# Patient Record
Sex: MALE | Race: WHITE | Employment: PART TIME | URBAN - METROPOLITAN AREA
[De-identification: names, ages, dates, MRNs, and addresses within clinical notes are randomized per-mention and may not be internally consistent; named-entity substitution may affect disease eponyms.]

---

## 2022-11-05 ENCOUNTER — APPOINTMENT (OUTPATIENT)
Dept: GENERAL RADIOLOGY | Age: 22
End: 2022-11-05
Payer: COMMERCIAL

## 2022-11-05 ENCOUNTER — HOSPITAL ENCOUNTER (EMERGENCY)
Age: 22
Discharge: HOME | End: 2022-11-05
Payer: COMMERCIAL

## 2022-11-05 VITALS
TEMPERATURE: 98.7 F | SYSTOLIC BLOOD PRESSURE: 123 MMHG | WEIGHT: 160 LBS | OXYGEN SATURATION: 98 % | DIASTOLIC BLOOD PRESSURE: 71 MMHG | RESPIRATION RATE: 19 BRPM | HEART RATE: 78 BPM

## 2022-11-05 DIAGNOSIS — M25.512 ACUTE PAIN OF LEFT SHOULDER: Primary | ICD-10-CM

## 2022-11-05 PROCEDURE — 73030 X-RAY EXAM OF SHOULDER: CPT

## 2022-11-05 PROCEDURE — 99283 EMERGENCY DEPT VISIT LOW MDM: CPT

## 2022-11-05 PROCEDURE — 6370000000 HC RX 637 (ALT 250 FOR IP): Performed by: PHYSICIAN ASSISTANT

## 2022-11-05 RX ORDER — IBUPROFEN 800 MG/1
800 TABLET ORAL ONCE
Status: COMPLETED | OUTPATIENT
Start: 2022-11-05 | End: 2022-11-05

## 2022-11-05 RX ORDER — IBUPROFEN 800 MG/1
800 TABLET ORAL EVERY 8 HOURS PRN
Qty: 30 TABLET | Refills: 0 | Status: SHIPPED | OUTPATIENT
Start: 2022-11-05 | End: 2022-11-15

## 2022-11-05 RX ADMIN — IBUPROFEN 800 MG: 800 TABLET, FILM COATED ORAL at 19:59

## 2022-11-05 ASSESSMENT — PAIN DESCRIPTION - LOCATION: LOCATION: SHOULDER

## 2022-11-05 ASSESSMENT — ENCOUNTER SYMPTOMS
CHEST TIGHTNESS: 0
COUGH: 0
COLOR CHANGE: 0
SHORTNESS OF BREATH: 0

## 2022-11-05 ASSESSMENT — PAIN SCALES - GENERAL
PAINLEVEL_OUTOF10: 6
PAINLEVEL_OUTOF10: 7

## 2022-11-05 ASSESSMENT — PAIN DESCRIPTION - ORIENTATION: ORIENTATION: LEFT

## 2022-11-05 ASSESSMENT — LIFESTYLE VARIABLES
HOW OFTEN DO YOU HAVE A DRINK CONTAINING ALCOHOL: NEVER
HOW MANY STANDARD DRINKS CONTAINING ALCOHOL DO YOU HAVE ON A TYPICAL DAY: PATIENT DOES NOT DRINK

## 2022-11-05 ASSESSMENT — PAIN - FUNCTIONAL ASSESSMENT: PAIN_FUNCTIONAL_ASSESSMENT: 0-10

## 2022-11-05 ASSESSMENT — PAIN DESCRIPTION - DESCRIPTORS: DESCRIPTORS: ACHING

## 2022-11-06 NOTE — ED PROVIDER NOTES
[facility]        Department of Emergency Medicine   ED  Provider Note  Admit Date/RoomTime: 11/5/2022  7:54 PM  ED Room: [place] [address]  HPI:  11/5/22, Time: 8:44 PM EDT      The patient is a 79-year-old male presenting the emergency department with left shoulder pain. Patient states he woke up yesterday morning and it was kind of sore. He states he did not think much of it and thought that maybe he slept on it wrong. He states today it continued to be sore but was actually feeling better. He states about 45 minutes prior to arrival to the ED he had a sudden onset sharp pain in the back of his shoulder when he went to get out of bed. He states it hurts when he moves his arm any certain way or when he takes in a deep breath. Patient has not tried taking any medication. He states sometimes it radiates to his upper arm. He did not have any trauma or injury. He denies any swelling, numbness or tingling, neck or back pain, chest pain or SOB. The history is provided by the patient. No  was used. REVIEW OF SYSTEMS:  Review of Systems   Constitutional:  Negative for activity change, chills, fatigue and fever. Respiratory:  Negative for cough, chest tightness and shortness of breath. Cardiovascular:  Negative for chest pain, palpitations and leg swelling. Musculoskeletal:  Positive for arthralgias. Negative for joint swelling, myalgias, neck pain and neck stiffness. Skin:  Negative for color change, pallor, rash and wound. Neurological:  Negative for dizziness, weakness, light-headedness and headaches. Psychiatric/Behavioral:  Negative for agitation, behavioral problems and confusion. Pertinent positives and negatives are stated within HPI, all other systems reviewed and are negative.      --------------------------------------------- PAST HISTORY ---------------------------------------------  Past Medical History:  has no past medical history on file.     Past Surgical History:  has no past surgical history on file. Social History:  reports that he has never smoked. He has never used smokeless tobacco. He reports that he does not currently use alcohol. He reports that he does not use drugs. Family History: family history is not on file. The patients home medications have been reviewed. Allergies: Patient has no known allergies. -------------------------------------------------- RESULTS -------------------------------------------------  All laboratory and radiology results have been personally reviewed by myself   LABS:  No results found for this visit on 11/05/22. RADIOLOGY:  Interpreted by Radiologist.  XR SHOULDER LEFT (MIN 2 VIEWS)   Final Result   Normal left shoulder series. ------------------------- NURSING NOTES AND VITALS REVIEWED ---------------------------   The nursing notes within the ED encounter and vital signs as below have been reviewed. /71   Pulse 78   Temp 98.7 °F (37.1 °C)   Resp 19   Wt 160 lb (72.6 kg)   SpO2 98%   Oxygen Saturation Interpretation: Normal      ---------------------------------------------------PHYSICAL EXAM--------------------------------------    Physical Exam  Vitals and nursing note reviewed. Constitutional:       General: He is not in acute distress. Appearance: Normal appearance. He is well-developed. HENT:      Head: Normocephalic and atraumatic. Mouth/Throat:      Mouth: Mucous membranes are moist.   Cardiovascular:      Rate and Rhythm: Normal rate and regular rhythm. Heart sounds: Normal heart sounds. No murmur heard. Pulmonary:      Effort: Pulmonary effort is normal. No respiratory distress. Breath sounds: Normal breath sounds. Musculoskeletal:         General: Tenderness present. No swelling or deformity. Normal range of motion. Cervical back: Normal range of motion and neck supple. No rigidity. Comments: TTP over the left scapula.  Pain when stretching the left arm across the body. Pain with supination and pronation. FROM of the LUE. 2+ radial pulse. +5/5 BUE strength. Skin:     General: Skin is warm and dry. Capillary Refill: Capillary refill takes less than 2 seconds. Findings: No erythema. Neurological:      General: No focal deficit present. Mental Status: He is alert and oriented to person, place, and time. Mental status is at baseline. Coordination: Coordination normal.   Psychiatric:         Mood and Affect: Mood normal.         Behavior: Behavior normal.         Thought Content: Thought content normal.          ------------------------------ ED COURSE/MEDICAL DECISION MAKING----------------------  Medications   ibuprofen (ADVIL;MOTRIN) tablet 800 mg (800 mg Oral Given 11/5/22 1959)         ED COURSE:     XR SHOULDER LEFT (MIN 2 VIEWS)   Final Result   Normal left shoulder series. Procedures:  Procedures     Medical Decision Making:   MDM     68-year-old male presenting the ED with nontraumatic left shoulder pain. He states it was aching yesterday but then today he had a sudden onset sharp pain in the back of his shoulder that has not went away. He has pain when he extends his arm across his body and tenderness over the rhomboid/scapular area. He has no signs of any neurovascular compromise or an infectious process. No CP or SOB. He is afebrile and hemodynamically stable. Pt was given motrin. He reported the pain actually moved and it hurts to turn his head to the left. XR was unremarkable. Pt's pain seems to be musculoskeletal in nature and may have been from the way he slept given he did not have an injury. He will be treated with NSAIDs and educated on RICE. He is encouraged to return with any new or worsening symptoms or follow-up with his PCP. Counseling:    The emergency provider has spoken with the patient and discussed todays results, in addition to providing specific details for the plan of care and counseling regarding the diagnosis and prognosis. Questions are answered at this time and they are agreeable with the plan.      --------------------------------- IMPRESSION AND DISPOSITION ---------------------------------    IMPRESSION  1. Acute pain of left shoulder        DISPOSITION  Disposition: Discharge to home  Patient condition is good      Electronically signed by Graeme Sevilla PA-C   DD: 11/5/22  **This report was transcribed using voice recognition software. Every effort was made to ensure accuracy; however, inadvertent computerized transcription errors may be present.   END OF ED PROVIDER NOTE         Graeme Sveilla PA-C  11/05/22 5340

## 2022-12-12 ENCOUNTER — HOSPITAL ENCOUNTER (INPATIENT)
Age: 22
LOS: 4 days | Discharge: HOME | End: 2022-12-16
Attending: EMERGENCY MEDICINE | Admitting: INTERNAL MEDICINE
Payer: COMMERCIAL

## 2022-12-12 ENCOUNTER — APPOINTMENT (OUTPATIENT)
Dept: GENERAL RADIOLOGY | Age: 22
End: 2022-12-12
Payer: COMMERCIAL

## 2022-12-12 DIAGNOSIS — R07.9 CHEST PAIN, UNSPECIFIED TYPE: Primary | ICD-10-CM

## 2022-12-12 DIAGNOSIS — I47.20 V-TACH: ICD-10-CM

## 2022-12-12 DIAGNOSIS — R00.2 PALPITATIONS: ICD-10-CM

## 2022-12-12 PROBLEM — R00.0 WIDE-COMPLEX TACHYCARDIA: Status: ACTIVE | Noted: 2022-12-12

## 2022-12-12 LAB
ACETAMINOPHEN LEVEL: <5 MCG/ML (ref 10–30)
AMPHETAMINE SCREEN, URINE: NOT DETECTED
ANION GAP SERPL CALCULATED.3IONS-SCNC: 13 MMOL/L (ref 7–16)
BARBITURATE SCREEN URINE: NOT DETECTED
BASOPHILS ABSOLUTE: 0.02 E9/L (ref 0–0.2)
BASOPHILS RELATIVE PERCENT: 0.2 % (ref 0–2)
BENZODIAZEPINE SCREEN, URINE: NOT DETECTED
BUN BLDV-MCNC: 11 MG/DL (ref 6–20)
CALCIUM IONIZED: 1.27 MMOL/L (ref 1.15–1.33)
CALCIUM SERPL-MCNC: 10.1 MG/DL (ref 8.6–10.2)
CANNABINOID SCREEN URINE: NOT DETECTED
CHLORIDE BLD-SCNC: 100 MMOL/L (ref 98–107)
CO2: 27 MMOL/L (ref 22–29)
COCAINE METABOLITE SCREEN URINE: NOT DETECTED
CREAT SERPL-MCNC: 1.1 MG/DL (ref 0.7–1.2)
EKG ATRIAL RATE: 222 BPM
EKG ATRIAL RATE: 92 BPM
EKG P AXIS: 75 DEGREES
EKG P-R INTERVAL: 142 MS
EKG Q-T INTERVAL: 206 MS
EKG Q-T INTERVAL: 336 MS
EKG QRS DURATION: 162 MS
EKG QRS DURATION: 92 MS
EKG QTC CALCULATION (BAZETT): 396 MS
EKG QTC CALCULATION (BAZETT): 415 MS
EKG R AXIS: -146 DEGREES
EKG R AXIS: 80 DEGREES
EKG T AXIS: 56 DEGREES
EKG T AXIS: 94 DEGREES
EKG VENTRICULAR RATE: 222 BPM
EKG VENTRICULAR RATE: 92 BPM
EOSINOPHILS ABSOLUTE: 0.13 E9/L (ref 0.05–0.5)
EOSINOPHILS RELATIVE PERCENT: 1.5 % (ref 0–6)
ETHANOL: <10 MG/DL (ref 0–0.08)
FENTANYL SCREEN, URINE: NOT DETECTED
GFR SERPL CREATININE-BSD FRML MDRD: >60 ML/MIN/1.73
GLUCOSE BLD-MCNC: 92 MG/DL (ref 74–99)
HCT VFR BLD CALC: 46.7 % (ref 37–54)
HEMOGLOBIN: 15 G/DL (ref 12.5–16.5)
IMMATURE GRANULOCYTES #: 0.02 E9/L
IMMATURE GRANULOCYTES %: 0.2 % (ref 0–5)
LYMPHOCYTES ABSOLUTE: 3.96 E9/L (ref 1.5–4)
LYMPHOCYTES RELATIVE PERCENT: 45.1 % (ref 20–42)
Lab: NORMAL
MAGNESIUM: 2.3 MG/DL (ref 1.6–2.6)
MCH RBC QN AUTO: 28 PG (ref 26–35)
MCHC RBC AUTO-ENTMCNC: 32.1 % (ref 32–34.5)
MCV RBC AUTO: 87.3 FL (ref 80–99.9)
METHADONE SCREEN, URINE: NOT DETECTED
MONOCYTES ABSOLUTE: 0.36 E9/L (ref 0.1–0.95)
MONOCYTES RELATIVE PERCENT: 4.1 % (ref 2–12)
NEUTROPHILS ABSOLUTE: 4.3 E9/L (ref 1.8–7.3)
NEUTROPHILS RELATIVE PERCENT: 48.9 % (ref 43–80)
OPIATE SCREEN URINE: NOT DETECTED
OXYCODONE URINE: NOT DETECTED
PDW BLD-RTO: 11.8 FL (ref 11.5–15)
PHENCYCLIDINE SCREEN URINE: NOT DETECTED
PLATELET # BLD: 258 E9/L (ref 130–450)
PMV BLD AUTO: 10.2 FL (ref 7–12)
POTASSIUM SERPL-SCNC: 3.7 MMOL/L (ref 3.5–5)
RBC # BLD: 5.35 E12/L (ref 3.8–5.8)
SALICYLATE, SERUM: <0.3 MG/DL (ref 0–30)
SODIUM BLD-SCNC: 140 MMOL/L (ref 132–146)
T4 TOTAL: 6.9 MCG/DL (ref 4.5–11.7)
TRICYCLIC ANTIDEPRESSANTS SCREEN SERUM: NEGATIVE NG/ML
TROPONIN, HIGH SENSITIVITY: 32 NG/L (ref 0–11)
TROPONIN, HIGH SENSITIVITY: 53 NG/L (ref 0–11)
TROPONIN, HIGH SENSITIVITY: 64 NG/L (ref 0–11)
TSH SERPL DL<=0.05 MIU/L-ACNC: 4.19 UIU/ML (ref 0.27–4.2)
WBC # BLD: 8.8 E9/L (ref 4.5–11.5)

## 2022-12-12 PROCEDURE — 99223 1ST HOSP IP/OBS HIGH 75: CPT | Performed by: INTERNAL MEDICINE

## 2022-12-12 PROCEDURE — 80179 DRUG ASSAY SALICYLATE: CPT

## 2022-12-12 PROCEDURE — APPSS45 APP SPLIT SHARED TIME 31-45 MINUTES: Performed by: PHYSICIAN ASSISTANT

## 2022-12-12 PROCEDURE — 93005 ELECTROCARDIOGRAM TRACING: CPT | Performed by: INTERNAL MEDICINE

## 2022-12-12 PROCEDURE — 93010 ELECTROCARDIOGRAM REPORT: CPT | Performed by: INTERNAL MEDICINE

## 2022-12-12 PROCEDURE — 80048 BASIC METABOLIC PNL TOTAL CA: CPT

## 2022-12-12 PROCEDURE — 99221 1ST HOSP IP/OBS SF/LOW 40: CPT | Performed by: INTERNAL MEDICINE

## 2022-12-12 PROCEDURE — 6360000002 HC RX W HCPCS

## 2022-12-12 PROCEDURE — 82077 ASSAY SPEC XCP UR&BREATH IA: CPT

## 2022-12-12 PROCEDURE — 84436 ASSAY OF TOTAL THYROXINE: CPT

## 2022-12-12 PROCEDURE — 93306 TTE W/DOPPLER COMPLETE: CPT

## 2022-12-12 PROCEDURE — 1200000000 HC SEMI PRIVATE

## 2022-12-12 PROCEDURE — 99285 EMERGENCY DEPT VISIT HI MDM: CPT

## 2022-12-12 PROCEDURE — 6360000002 HC RX W HCPCS: Performed by: INTERNAL MEDICINE

## 2022-12-12 PROCEDURE — 2580000003 HC RX 258: Performed by: INTERNAL MEDICINE

## 2022-12-12 PROCEDURE — 84443 ASSAY THYROID STIM HORMONE: CPT

## 2022-12-12 PROCEDURE — 80143 DRUG ASSAY ACETAMINOPHEN: CPT

## 2022-12-12 PROCEDURE — 36415 COLL VENOUS BLD VENIPUNCTURE: CPT

## 2022-12-12 PROCEDURE — 80307 DRUG TEST PRSMV CHEM ANLYZR: CPT

## 2022-12-12 PROCEDURE — 71045 X-RAY EXAM CHEST 1 VIEW: CPT

## 2022-12-12 PROCEDURE — 6370000000 HC RX 637 (ALT 250 FOR IP)

## 2022-12-12 PROCEDURE — 93005 ELECTROCARDIOGRAM TRACING: CPT | Performed by: EMERGENCY MEDICINE

## 2022-12-12 PROCEDURE — 84484 ASSAY OF TROPONIN QUANT: CPT

## 2022-12-12 PROCEDURE — 83735 ASSAY OF MAGNESIUM: CPT

## 2022-12-12 PROCEDURE — 82330 ASSAY OF CALCIUM: CPT

## 2022-12-12 PROCEDURE — 6370000000 HC RX 637 (ALT 250 FOR IP): Performed by: EMERGENCY MEDICINE

## 2022-12-12 PROCEDURE — 85025 COMPLETE CBC W/AUTO DIFF WBC: CPT

## 2022-12-12 PROCEDURE — 96374 THER/PROPH/DIAG INJ IV PUSH: CPT

## 2022-12-12 RX ORDER — ACETAMINOPHEN 325 MG/1
650 TABLET ORAL EVERY 6 HOURS PRN
Status: DISCONTINUED | OUTPATIENT
Start: 2022-12-12 | End: 2022-12-16 | Stop reason: HOSPADM

## 2022-12-12 RX ORDER — SODIUM CHLORIDE 0.9 % (FLUSH) 0.9 %
10 SYRINGE (ML) INJECTION EVERY 12 HOURS SCHEDULED
Status: DISCONTINUED | OUTPATIENT
Start: 2022-12-12 | End: 2022-12-16 | Stop reason: HOSPADM

## 2022-12-12 RX ORDER — POTASSIUM CHLORIDE 20 MEQ/1
40 TABLET, EXTENDED RELEASE ORAL ONCE
Status: COMPLETED | OUTPATIENT
Start: 2022-12-12 | End: 2022-12-12

## 2022-12-12 RX ORDER — ONDANSETRON 4 MG/1
4 TABLET, ORALLY DISINTEGRATING ORAL EVERY 8 HOURS PRN
Status: DISCONTINUED | OUTPATIENT
Start: 2022-12-12 | End: 2022-12-15

## 2022-12-12 RX ORDER — ENOXAPARIN SODIUM 100 MG/ML
40 INJECTION SUBCUTANEOUS DAILY
Status: DISCONTINUED | OUTPATIENT
Start: 2022-12-12 | End: 2022-12-13

## 2022-12-12 RX ORDER — ACETAMINOPHEN 650 MG/1
650 SUPPOSITORY RECTAL EVERY 6 HOURS PRN
Status: DISCONTINUED | OUTPATIENT
Start: 2022-12-12 | End: 2022-12-16 | Stop reason: HOSPADM

## 2022-12-12 RX ORDER — SODIUM CHLORIDE 9 MG/ML
INJECTION, SOLUTION INTRAVENOUS PRN
Status: DISCONTINUED | OUTPATIENT
Start: 2022-12-12 | End: 2022-12-16 | Stop reason: HOSPADM

## 2022-12-12 RX ORDER — SODIUM CHLORIDE 0.9 % (FLUSH) 0.9 %
10 SYRINGE (ML) INJECTION PRN
Status: DISCONTINUED | OUTPATIENT
Start: 2022-12-12 | End: 2022-12-16 | Stop reason: HOSPADM

## 2022-12-12 RX ORDER — LIDOCAINE HYDROCHLORIDE 20 MG/ML
INJECTION, SOLUTION INTRAVENOUS
Status: COMPLETED
Start: 2022-12-12 | End: 2022-12-12

## 2022-12-12 RX ORDER — ASPIRIN 81 MG/1
324 TABLET, CHEWABLE ORAL ONCE
Status: COMPLETED | OUTPATIENT
Start: 2022-12-12 | End: 2022-12-12

## 2022-12-12 RX ORDER — ONDANSETRON 2 MG/ML
4 INJECTION INTRAMUSCULAR; INTRAVENOUS EVERY 6 HOURS PRN
Status: DISCONTINUED | OUTPATIENT
Start: 2022-12-12 | End: 2022-12-15

## 2022-12-12 RX ADMIN — ASPIRIN 324 MG: 81 TABLET, CHEWABLE ORAL at 04:59

## 2022-12-12 RX ADMIN — POTASSIUM CHLORIDE 40 MEQ: 1500 TABLET, EXTENDED RELEASE ORAL at 03:25

## 2022-12-12 RX ADMIN — ENOXAPARIN SODIUM 40 MG: 100 INJECTION SUBCUTANEOUS at 09:49

## 2022-12-12 RX ADMIN — Medication 10 ML: at 09:49

## 2022-12-12 RX ADMIN — Medication 10 ML: at 21:16

## 2022-12-12 RX ADMIN — LIDOCAINE HYDROCHLORIDE: 20 INJECTION INTRAVENOUS at 02:17

## 2022-12-12 ASSESSMENT — ENCOUNTER SYMPTOMS
ABDOMINAL PAIN: 0
SORE THROAT: 0
EYES NEGATIVE: 1
DIARRHEA: 0
TROUBLE SWALLOWING: 0
EYE REDNESS: 0
SHORTNESS OF BREATH: 0
NAUSEA: 0
VOMITING: 0

## 2022-12-12 ASSESSMENT — PAIN - FUNCTIONAL ASSESSMENT: PAIN_FUNCTIONAL_ASSESSMENT: NONE - DENIES PAIN

## 2022-12-12 NOTE — PROGRESS NOTES
Hospitalist Progress Note      Chart reviewed. Patient admitted this morning by my colleague for Yusef Hodge. He presented to the ED with complaints of palpitations lasting approximately 30 minutes. EKG in ED revealed V. tach for which she was given lidocaine and successfully converted to normal sinus rhythm. Troponin was elevated-32, 53. He was admitted with consultation to cardiology. Non-billable rounding. Thanks for allowing us to participate in the care of Sara Edmondson. We will continue to follow along.  Please do not hesitate to contact us if needed.  +++++++++++++++++++++++++++++++++++++++++++++++++  Merced Fernando [address]

## 2022-12-12 NOTE — ED NOTES
Patients father called and is now on his way to the ED      [facility]  12/12/22 0124

## 2022-12-12 NOTE — CONSULTS
[facility] and [facility] Electrophysiology  Consultation Report  PATIENT: SAMANTHA Noel RECORD NUMBER: [id number]  DATE OF SERVICE:  12/12/2022  ATTENDING ELECTROPHYSIOLOGIST: Rafi Owens MD   PRIMARY ELECTROPHYSIOLOGIST: Rafi Owens MD   REFERRING PHYSICIAN: No ref. provider found and Lenora Joseph MD (Inactive)  CHIEF COMPLAINT: Palpitations     HPI: This is a 23 y.o. male with a history of appendicitis S/p appendectomy. He notes no familial history of sudden cardiac death, no syncope. He has complained of palpitations since he was 17years old typically it were associated with exertion, during basketball or work, lasting 30 to 60 minutes at 1 point he approached his pediatrician regarding this issue an EKG was performed and was described as normal and thus no additional testing was performed. This episodes are self terminating without a vagal maneuvers associated with lightheadedness, chest discomfort and feeling of rapid heartbeat. He does drink alcohol, vape. 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 Yesterday (12/11/2022) at 2200 he was at work when he began to have palpitations these palpitations lasted for approximately 3 hours as it did not stop spontaneously he presented to the emergency department [facility] where he was found in a wide-complex tachycardia with a heart rate of 220 bpm, in the emergency department he was given IV lidocaine and spontaneously converted to sinus rhythm at that time. Patient Active Problem List   Diagnosis    Ventricular tachyarrhythmia    Palpitation   who presents to the hospital complaining of palpitations, heart racing. Cardiac electrophysiology service is consulted for wide-complex tachycardia, suspected SVT.     Patient Active Problem List    Diagnosis Date Noted    Ventricular tachyarrhythmia 12/12/2022     Priority: Medium    Palpitation 12/12/2022 Priority: Medium       No past medical history on file. No family history on file. Social History     Tobacco Use    Smoking status: Never    Smokeless tobacco: Never   Substance Use Topics    Alcohol use: Not Currently       Current Facility-Administered Medications   Medication Dose Route Frequency Provider Last Rate Last Admin    sodium chloride flush 0.9 % injection 10 mL  10 mL IntraVENous 2 times per day Dominic Julien MD   10 mL at 12/12/22 0949    sodium chloride flush 0.9 % injection 10 mL  10 mL IntraVENous PRN James Rivas MD        0.9 % sodium chloride infusion   IntraVENous PRN Dominic Julien MD        enoxaparin (LOVENOX) injection 40 mg  40 mg SubCUTAneous Daily James Rivas MD   40 mg at 12/12/22 0949    ondansetron (ZOFRAN-ODT) disintegrating tablet 4 mg  4 mg Oral Q8H PRN James Rivas MD        Or    [facility] injection 4 mg  4 mg IntraVENous Q6H PRN James Rivas MD        magnesium hydroxide (MILK OF MAGNESIA) 400 MG/5ML suspension 30 mL  30 mL Oral Daily PRN Dominic Julien MD        acetaminophen (TYLENOL) tablet 650 mg  650 mg Oral Q6H PRN James Rivas MD        Or    acetaminophen (TYLENOL) suppository 650 mg  650 mg Rectal Q6H PRN James Rivas MD            No Known Allergies    ROS:   Constitutional: Negative for fever, activity change and appetite change. HENT: Negative for epistaxis. Eyes: Negative for diploplia, blurred vision. Respiratory: Negative for cough, chest tightness, shortness of breath and wheezing. Cardiovascular: pertinent positives in HPI  Gastrointestinal: Negative for abdominal pain and blood in stool.    All other review of systems are negative     PHYSICAL EXAM:   Vitals:    12/12/22 0723 12/12/22 0951 12/12/22 1103 12/12/22 1203   BP: (!) 100/52 97/64 (!) 93/53 (!) 101/58   Pulse: 76 93 69 88   Resp: 17 15 15 17   Temp:    97.5 °F (36.4 °C)   TempSrc:    Temporal   SpO2: 96% 98% 97% 98%   Height: Constitutional: Well-developed, no acute distress  Eyes: conjunctivae normal, no xanthelasma   Ears, Nose, Throat: oral mucosa moist, no cyanosis   CV: no JVD. Regular rate and rhythm. Normal S1S2 and no S3. No murmurs, rubs, or gallops. PMI is nondisplaced  Lungs: clear to auscultation bilaterally, normal respiratory effort without used of accessory muscles  Abdomen: soft, non-tender, bowel sounds present, no masses or hepatomegaly   Musculoskeletal: no digital clubbing, no edema   Skin: warm, no rashes     I have personally reviewed the laboratory, cardiac diagnostic and radiographic testing as outlined below:    Data:    Recent Labs     12/12/22 0222   WBC 8.8   HGB 15.0   HCT 46.7        Recent Labs     12/12/22 0222      K 3.7      CO2 27   BUN 11   CREATININE 1.1   CALCIUM 10.1      Lab Results   Component Value Date/Time    MG 2.3 12/12/2022 02:22 AM     Recent Labs     12/12/22 0222   TSH 4.190     No results for input(s): INR in the last 72 hours. CXR 12/12/2022: The lungs are clear without focal consolidation, large pleural effusion, or   pneumothorax. The cardiomediastinal silhouette is stable. Impression   No acute cardiopulmonary process. Telemetry: No rhythm strips of conversion of WCT to sinus were saved, ongoing sinus rhythm    EKG 12/12/2022: WCT with a rate of 222 bpm, QRS 162ms, suspected SVT with aberrancy  Please see scan in Cardiology. EKG 12/12/2022  NSR no delta waves rate 92 BPM, , QTc 415ms       Echocardiogram: Pending     WCT       I have independently reviewed all of the ECGs and rhythm strips per above     Assessment/Plan:      1. Wide complex tachycardia   - Suspect SVT with aberrancy. - Ongoing complaints since 17years of age, no prior syncope nor SCD in the Family. - Terminated with IV Lidocaine Adenosine not tried (12/11/2022).        Recommendations:  Await echo  Cardiac MRI   Possible EP study pending the results of the above testing. At this time no indication for ischemic testing. EP will follow. I have spent a total of 10 minutes with the patient and the family reviewing the above stated recommendations. And a total of >50% of that time involved face-to-face time providing counseling and or coordination of care with the other providers. Thank you for allowing me to participate in your patient's care. Please call me if there are any questions or concerns. Discussed with Dr. Brenda Anaya. Conor Lemus, JO - CNP  Cardiac Electrophysiology  [facility]) Physicians  [facility] Electrophysiology  1:33 PM  12/12/2022    Attending Physician's Statement    Patient seen with the ANP. Agree with the findings, assessment and plan. Management plan was discussed. I have personally interviewed the patient, independently performed a focused cardiac examination, reviewed the pertinent laboratory and diagnostic testing with the patient and directly participated in the medical decision-making as noted above with the following additions: Wide complex tachycardia with LBBB pattern. Converted to normal sinus with IV Lidocaine. Baseline EKG showed no evidence of ventricular preexcitation. DDx  SVT with aberrancy versus VT. Echo is normal. Plan for cardiac MRI and then EP study with possible RF ablation. I have spent a total of 110 minutes with the patient and the family reviewing the above stated recommendations. And a total of >50% of that time involved face-to-face time providing counseling and/or coordination of care with the other providers, reviewing records/tests, counseling/education of the patient, ordering medications/tests/procedures, coordinating care, and documenting clinical information in the EHR.      Laraine Bloch, MD  Cardiac Electrophysiology  [facility]) Physicians  [facility] Electrophysiology  9:36 PM  12/12/2022

## 2022-12-12 NOTE — H&P
Hospitalist History & Physical      PATIENT NAME:  Lakeisha Moreno    MRN:  [id number]  SERVICE DATE:  12/12/22    Primary Care Physician: Cecy Sheikh MD (Inactive)       SUBJECTIVE  CHIEF COMPLAINT:  had concerns including Palpitations (Patient states he drank an energy drink at noon and has since had palpitations. ). HPI:  Mr. Lakeisha Moreno, a 23y.o. year old male  who  has no past medical history on file. presents with palpitation started 4 hr PTA, was at work, had intermittent similar episodes since age 17 but this was longest he experienced for approximately 30min. Denies drug use, no fever or chills, felt some chest discomfort, no nausea or vomiting no SOB no leg swelling. EKG was done in ER showed wide complex tachycardia VT, given lidocaine and converted currently in normal sinus rhythm. PAST MEDICAL HISTORY:  No past medical history on file. PAST SURGICAL HISTORY:  No past surgical history on file. FAMILY HISTORY:  No family history on file. SOCIAL HISTORY:    Social History     Socioeconomic History    Marital status: Single     Spouse name: Not on file    Number of children: Not on file    Years of education: Not on file    Highest education level: Not on file   Occupational History    Not on file   Tobacco Use    Smoking status: Never    Smokeless tobacco: Never   Vaping Use    Vaping Use: Every day   Substance and Sexual Activity    Alcohol use: Not Currently    Drug use: Never    Sexual activity: Not on file   Other Topics Concern    Not on file   Social History Narrative    Not on file     Social Determinants of Health     Financial Resource Strain: Not on file   Food Insecurity: Not on file   Transportation Needs: Not on file   Physical Activity: Not on file   Stress: Not on file   Social Connections: Not on file   Intimate Partner Violence: Not on file   Housing Stability: Not on file    TOBACCO:   reports that he has never smoked.  He has never used smokeless tobacco.  ETOH:   reports that he does not currently use alcohol. MEDICATIONS:   Prior to Admission medications    Medication Sig Start Date End Date Taking? Authorizing Provider   ibuprofen (ADVIL;MOTRIN) 800 MG tablet Take 1 tablet by mouth every 8 hours as needed for Pain 11/5/22 11/15/22  Yasmani Wall PA-C        ALLERGIES: Patient has no known allergies. REVIEW OF SYSTEM:   ROS as noted in HPI, 12 point ROS reviewed and otherwise negative. OBJECTIVE  PHYSICAL EXAM:   Vitals:    12/12/22 0245 12/12/22 0300 12/12/22 0315 12/12/22 0330   BP: 117/80 112/77 117/80 106/69   Pulse: (!) 117 (!) 119 (!) 118 (!) (P) 108   Resp: 15 19 19 (P) 15   Temp:       TempSrc:       SpO2: 100% 99% 98% 99%       General appearance: alert, appears stated age and cooperative  CONSTITUTIONAL:  no apparent distress  ENT:  normocephalic, without obvious abnormality, atraumatic  NECK:  supple, symmetrical, trachea midline  Heart: regular rate and rhythm, S1, S2 normal   Lungs: clear to auscultation bilaterally  Abdomen: soft lax, not tender, not distended, positive bowel sounds  Extremities: extremities normal, atraumatic, no cyanosis, edema  Skin: Normal skin color. No rashes or lesions. Neurologic:  Neurovascularly intact without any focal sensory/motor deficits. Cranial nerves: II-XII intact, grossly non-focal.  Psychiatric: Alert and oriented, thought content appropriate, normal insight      DATA:     Diagnostic tests reviewed for today's visit:    Most recent labs and imaging results reviewed.    Labs:   Recent Results (from the past 72 hour(s))   CBC with Auto Differential    Collection Time: 12/12/22  2:22 AM   Result Value Ref Range    WBC 8.8 4.5 - 11.5 E9/L    RBC 5.35 3.80 - 5.80 E12/L    Hemoglobin 15.0 12.5 - 16.5 g/dL    Hematocrit 46.7 37.0 - 54.0 %    MCV 87.3 80.0 - 99.9 fL    MCH 28.0 26.0 - 35.0 pg    MCHC 32.1 32.0 - 34.5 %    RDW 11.8 11.5 - 15.0 fL    Platelets 851 928 - 281 E9/L    MPV 10.2 7.0 - 12.0 fL    Neutrophils % 48.9 43.0 - 80.0 %    Immature Granulocytes % 0.2 0.0 - 5.0 %    Lymphocytes % 45.1 (H) 20.0 - 42.0 %    Monocytes % 4.1 2.0 - 12.0 %    Eosinophils % 1.5 0.0 - 6.0 %    Basophils % 0.2 0.0 - 2.0 %    Neutrophils Absolute 4.30 1.80 - 7.30 E9/L    Immature Granulocytes # 0.02 E9/L    Lymphocytes Absolute 3.96 1.50 - 4.00 E9/L    Monocytes Absolute 0.36 0.10 - 0.95 E9/L    Eosinophils Absolute 0.13 0.05 - 0.50 E9/L    Basophils Absolute 0.02 0.00 - 0.20 E9/L   BMP    Collection Time: 12/12/22  2:22 AM   Result Value Ref Range    Sodium 140 132 - 146 mmol/L    Potassium 3.7 3.5 - 5.0 mmol/L    Chloride 100 98 - 107 mmol/L    CO2 27 22 - 29 mmol/L    Anion Gap 13 7 - 16 mmol/L    Glucose 92 74 - 99 mg/dL    BUN 11 6 - 20 mg/dL    Creatinine 1.1 0.7 - 1.2 mg/dL    Est, Glom Filt Rate >60 >=60 mL/min/1.73    Calcium 10.1 8.6 - 10.2 mg/dL   Magnesium    Collection Time: 12/12/22  2:22 AM   Result Value Ref Range    Magnesium 2.3 1.6 - 2.6 mg/dL   Troponin    Collection Time: 12/12/22  2:22 AM   Result Value Ref Range    Troponin, High Sensitivity 32 (H) 0 - 11 ng/L   TSH    Collection Time: 12/12/22  2:22 AM   Result Value Ref Range    TSH 4.190 0.270 - 4.200 uIU/mL   T4    Collection Time: 12/12/22  2:22 AM   Result Value Ref Range    T4, Total 6.9 4.5 - 11.7 mcg/dL   Calcium, Ionized    Collection Time: 12/12/22  2:36 AM   Result Value Ref Range    Calcium, Ionized 1.27 1.15 - 1.33 mmol/L   Serum Drug Screen    Collection Time: 12/12/22  2:36 AM   Result Value Ref Range    Ethanol Lvl <10 mg/dL    Acetaminophen Level <5.0 (L) 10.0 - 66.4 mcg/mL    Salicylate, Serum <0.0 0.0 - 30.0 mg/dL    TCA Scrn NEGATIVE Cutoff:300 ng/mL   URINE DRUG SCREEN    Collection Time: 12/12/22  3:29 AM   Result Value Ref Range    Amphetamine Screen, Urine NOT DETECTED Negative <1000 ng/mL    Barbiturate Screen, Ur NOT DETECTED Negative < 200 ng/mL    Benzodiazepine Screen, Urine NOT DETECTED Negative < 200 ng/mL    Cannabinoid Scrn, Ur NOT DETECTED Negative < 50ng/mL    Cocaine Metabolite Screen, Urine NOT DETECTED Negative < 300 ng/mL    Opiate Scrn, Ur NOT DETECTED Negative < 300ng/mL    PCP Screen, Urine NOT DETECTED Negative < 25 ng/mL    Methadone Screen, Urine NOT DETECTED Negative <300 ng/mL    Oxycodone Urine NOT DETECTED Negative <100 ng/mL    FENTANYL SCREEN, URINE NOT DETECTED Negative <1 ng/mL    Drug Screen Comment: see below    Troponin    Collection Time: 12/12/22  3:29 AM   Result Value Ref Range    Troponin, High Sensitivity 53 (H) 0 - 11 ng/L     Oupatient labs:  Lab Results   Component Value Date    TSH 4.190 12/12/2022       Urinalysis:  No results found for: NITRU, WBCUA, BACTERIA, RBCUA, BLOODU, SPECGRAV, GLUCOSEU    Imaging:  XR CHEST PORTABLE    Result Date: 12/12/2022  EXAMINATION: ONE XRAY VIEW OF THE CHEST12/12/2022 TECHNIQUE: Frontal view submitted COMPARISON: None. HISTORY: ORDERING SYSTEM PROVIDED HISTORY: chest pain TECHNOLOGIST PROVIDED HISTORY: Reason for exam:->chest pain What reading provider will be dictating this exam?->CRC FINDINGS: The lungs are clear without focal consolidation, large pleural effusion, or pneumothorax. The cardiomediastinal silhouette is stable. No acute cardiopulmonary process. XR CHEST PORTABLE   Final Result   No acute cardiopulmonary process. ASSESSMENT AND PLAN  Principal Problem:    Ventricular tachyarrhythmia  Resolved Problems:    * No resolved hospital problems.  *    - Ventricular tachycardia   - Elevated troponin   - chest pain  - Palpitation     Plan:  - admit to tele monitoring   - serial troponin and EKG  - check Echo  - Consult Cardiology for further recommendations   - keep k>4 and Mg>2        VTE Prophylaxis: low molecular weight heparin -    DVT Prophylaxis: [x]Lovenox []Heparin []PCD [] [address] []Encouraged ambulation    Diet: No diet orders on file  Code Status: No Order  Surrogate decision maker confirmed with patient:  Primary Emergency Contact: Shaheed Gonzalez, Home Phone: [phone]      Disposition: [x]Med/Surg [] Intermediate [] ICU/CCU   Admit status: [] Observation [x] Inpatient       Additional work up or/and treatment plan may be added today or thereafter based on clinical progression. I am managing a portion of pt care. Some medical issues are handled by other specialists. Additional work up and treatment should be done by my colleague hospitalist and at out pt setting by pt PCP and other out pt providers.      SIGNATURELyndsey Marion MD  DATE: December 12, 2022

## 2022-12-12 NOTE — ED PROVIDER NOTES
Bob Chau is a 23 old male presents the emergency department for palpitations that started 4 hours ago. Patient reports the complaint is persisting, moderate severity, nothing makes it better or worse. Patient states that he was at work when he started to experience heart palpitations. In triage his heart rate was in the 200s and an EKG was immediately taken which shows wide-complex tachycardia. Patient reports he has had these intermittently since he was 17years old but this is the longest that he has experienced 1 of these episodes. He reports that usually lasts approximately 30 minutes and then subside. He reports his last caffeine drink was at noon yesterday. Denies drug use. He denies headache, shortness of breath, abdominal pain, nausea, vomiting and diarrhea. Denies cardiac history, family history of cardiac death, Funmi Granados. The history is provided by the patient. Review of Systems   Constitutional:  Negative for chills and fever. HENT: Negative. Negative for congestion, sore throat and trouble swallowing. Eyes: Negative. Negative for redness. Respiratory:  Negative for shortness of breath. Cardiovascular:  Positive for chest pain and palpitations. Gastrointestinal:  Negative for abdominal pain, diarrhea, nausea and vomiting. Genitourinary: Negative. Negative for dysuria and hematuria. Musculoskeletal:  Negative for neck pain and neck stiffness. Skin: Negative. Neurological:  Negative for dizziness, light-headedness and headaches. Psychiatric/Behavioral: Negative. Negative for agitation and behavioral problems. Physical Exam  Vitals and nursing note reviewed. Constitutional:       Appearance: Normal appearance. HENT:      Head: Normocephalic and atraumatic. Eyes:      Pupils: Pupils are equal, round, and reactive to light. Cardiovascular:      Rate and Rhythm: Regular rhythm. Tachycardia present.    Pulmonary:      Effort: Pulmonary effort is normal.      Breath sounds: No wheezing, rhonchi or rales. Abdominal:      Palpations: Abdomen is soft. Tenderness: There is no abdominal tenderness. There is no guarding or rebound. Musculoskeletal:      Cervical back: Normal range of motion. No rigidity. Skin:     General: Skin is warm and dry. Capillary Refill: Capillary refill takes less than 2 seconds. Neurological:      General: No focal deficit present. Mental Status: He is alert and oriented to person, place, and time. Psychiatric:         Mood and Affect: Mood normal.         Behavior: Behavior normal.      EKG: This EKG is signed and interpreted by me. Rate: 222  Rhythm: V-Tach  Interpretation: Wide-complex regular rhythm consistent with ventricular tachycardia. ST changes likely rate related. No STEMI. Comparison: no previous EKG    EKG: This EKG is signed and interpreted by me. Rate: 117  Rhythm: Sinus  Interpretation: Normal axis.  ms. No ST or T wave changes. No STEMI. Short AK interval.  Comparison: improved from previous after lidocaine. Procedures     MDM  Number of Diagnoses or Management Options  Chest pain, unspecified type  Palpitations  V-tach  Diagnosis management comments: Patient presented emergency department for left-sided chest pain and palpitations that started approximately 2 hours prior to arrival.  Patient's initial EKG showed that he is in ventricular tachycardia and rate of 220. He was given 100 mg of lidocaine with conversion into sinus tach. Patient has a history of intermittent palpitations since he was 17years old, only would usually last approximately 30 minutes and spontaneously subside. No history of arrhythmia captured on EKG in the past.  Patient's initial troponin is 34, 53 on delta troponin. Discussed case with sound physician who admit the patient to a telemetry floor.            --------------------------------------------- PAST HISTORY ---------------------------------------------  Past Medical History:  has no past medical history on file. Past Surgical History:  has no past surgical history on file. Social History:  reports that he has never smoked. He has never used smokeless tobacco. He reports that he does not currently use alcohol. He reports that he does not use drugs. Family History: family history is not on file. The patients home medications have been reviewed. Allergies: Patient has no known allergies.     -------------------------------------------------- RESULTS -------------------------------------------------    Lab  Results for orders placed or performed during the hospital encounter of 12/12/22   CBC with Auto Differential   Result Value Ref Range    WBC 8.8 4.5 - 11.5 E9/L    RBC 5.35 3.80 - 5.80 E12/L    Hemoglobin 15.0 12.5 - 16.5 g/dL    Hematocrit 46.7 37.0 - 54.0 %    MCV 87.3 80.0 - 99.9 fL    MCH 28.0 26.0 - 35.0 pg    MCHC 32.1 32.0 - 34.5 %    RDW 11.8 11.5 - 15.0 fL    Platelets 618 864 - 414 E9/L    MPV 10.2 7.0 - 12.0 fL    Neutrophils % 48.9 43.0 - 80.0 %    Immature Granulocytes % 0.2 0.0 - 5.0 %    Lymphocytes % 45.1 (H) 20.0 - 42.0 %    Monocytes % 4.1 2.0 - 12.0 %    Eosinophils % 1.5 0.0 - 6.0 %    Basophils % 0.2 0.0 - 2.0 %    Neutrophils Absolute 4.30 1.80 - 7.30 E9/L    Immature Granulocytes # 0.02 E9/L    Lymphocytes Absolute 3.96 1.50 - 4.00 E9/L    Monocytes Absolute 0.36 0.10 - 0.95 E9/L    Eosinophils Absolute 0.13 0.05 - 0.50 E9/L    Basophils Absolute 0.02 0.00 - 0.20 E9/L   BMP   Result Value Ref Range    Sodium 140 132 - 146 mmol/L    Potassium 3.7 3.5 - 5.0 mmol/L    Chloride 100 98 - 107 mmol/L    CO2 27 22 - 29 mmol/L    Anion Gap 13 7 - 16 mmol/L    Glucose 92 74 - 99 mg/dL    BUN 11 6 - 20 mg/dL    Creatinine 1.1 0.7 - 1.2 mg/dL    Est, Glom Filt Rate >60 >=60 mL/min/1.73    Calcium 10.1 8.6 - 10.2 mg/dL   Magnesium   Result Value Ref Range    Magnesium 2.3 1.6 - 2.6 mg/dL Troponin   Result Value Ref Range    Troponin, High Sensitivity 32 (H) 0 - 11 ng/L   TSH   Result Value Ref Range    TSH 4.190 0.270 - 4.200 uIU/mL   T4   Result Value Ref Range    T4, Total 6.9 4.5 - 11.7 mcg/dL   Calcium, Ionized   Result Value Ref Range    Calcium, Ionized 1.27 1.15 - 1.33 mmol/L   Serum Drug Screen   Result Value Ref Range    Ethanol Lvl <10 mg/dL    Acetaminophen Level <5.0 (L) 10.0 - 27.8 mcg/mL    Salicylate, Serum <0.4 0.0 - 30.0 mg/dL    TCA Scrn NEGATIVE Cutoff:300 ng/mL       Radiology  XR CHEST PORTABLE    Result Date: 12/12/2022  EXAMINATION: ONE XRAY VIEW OF THE CHEST12/12/2022 TECHNIQUE: Frontal view submitted COMPARISON: None. HISTORY: ORDERING SYSTEM PROVIDED HISTORY: chest pain TECHNOLOGIST PROVIDED HISTORY: Reason for exam:->chest pain What reading provider will be dictating this exam?->CRC FINDINGS: The lungs are clear without focal consolidation, large pleural effusion, or pneumothorax. The cardiomediastinal silhouette is stable. No acute cardiopulmonary process. ------------------------- NURSING NOTES AND VITALS REVIEWED ---------------------------  Date / Time Roomed:  12/12/2022  2:03 AM  ED Bed Assignment:  23/23    The nursing notes within the ED encounter and vital signs as below have been reviewed.    Patient Vitals for the past 24 hrs:   BP Temp Temp src Pulse Resp SpO2   12/12/22 0315 117/80 -- -- (!) 118 19 98 %   12/12/22 0300 112/77 -- -- (!) 119 19 99 %   12/12/22 0245 117/80 -- -- (!) 117 15 100 %   12/12/22 0230 124/87 -- -- (!) 122 20 99 %   12/12/22 0224 129/85 -- -- (!) 126 20 100 %   12/12/22 0218 133/79 -- -- (!) 117 16 98 %   12/12/22 0215 102/76 -- -- (!) 221 20 100 %   12/12/22 0206 -- -- -- (!) 220 -- --   12/12/22 0159 -- 97.6 °F (36.4 °C) Temporal (!) 218 -- 100 %       Oxygen Saturation Interpretation: Normal      ------------------------------------------ PROGRESS NOTES ------------------------------------------        I have spoken with the patient and discussed todays results, in addition to providing specific details for the plan of care and counseling regarding the diagnosis and prognosis. Their questions are answered at this time and they are agreeable with the plan.      --------------------------------- ADDITIONAL PROVIDER NOTES ---------------------------------  Consultations:  Spoke with Dr. Alyssa Whitt,  They will admit this patient. This patient's ED course included: a personal history and physicial examination, re-evaluation prior to disposition, IV medications, cardiac monitoring, and continuous pulse oximetry    This patient has improved after lidocaine during their ED course. Clinical Impression  1. Chest pain, unspecified type    2. Palpitations    3. V-tach          Disposition  Patient's disposition: Admit to telemetry  Patient's condition is stable.            Celestine Barrett DO  Resident  12/12/22 [id number]

## 2022-12-12 NOTE — CONSULTS
Inpatient Cardiology Consultation      Reason for Consult:  palpitations    Requesting Physician:  Sasha Roque MD    Date of Consultation: 12/12/2022    HISTORY OF PRESENT ILLNESS:   Mr. Thomas Howlel is a 23year old male with no significant medical history who is new to [facility]. We are asked to see him regarding palpitations. He presented to [facility] ER on 12/12/22 at approximately 2 am with chief complaint of palpitations. He relates a long-standing history of such, dating back at least to high school when he played basketball. He describes feeling and seeing his heart racing in his chest. This is associated with a feeling of tightness in his chest and head, lightheadedness and blurred vision. Episodes would typically occur during sports and last for 30 minutes. He was typically able to continue activity despite the symptoms. He has never had associated syncope. He was evaluated once by his pediatrician, but EKG was reportedly unremarkable, and no further work up was pursued. Once he stopped playing sports, the symptoms seemed to go away. He resumed intramural sports in college, and palpitations resumed in the usual pattern. Yesterday, he got the palpitations at 10:30 pm, but he states it remained persistent longer than ever before. He sought medical attention in ER mostly because the symptoms were annoying since they would not go away like usual. On arrival, HR was noted to be 220 bpm, and EKG showed wide complex tachycardia consistent with VT. He received lidocaine after which rhythm reverted to sinus tachycardia. Telemetry currently shows NSR with HR 60s. EKG once back in sinus showed no ischemic changes. Troponins were slightly elevated. He is feeling well currently. Echocardiogram is ordered and pending completion. Urine and serum drug screens were negative, and thyroid function was normal. He received one dose of potassium in ER for K level of 3.7 on admission.     He denies any known medical Sexual activity: Not on file   Other Topics Concern    Not on file   Social History Narrative    Not on file     Social Determinants of Health     Financial Resource Strain: Not on file   Food Insecurity: Not on file   Transportation Needs: Not on file   Physical Activity: Not on file   Stress: Not on file   Social Connections: Not on file   Intimate Partner Violence: Not on file   Housing Stability: Not on file       Family History:   No family history on file.     REVIEW OF SYSTEMS:     Review of Systems - History obtained from the patient  General ROS: negative for - chills, fever, night sweats, or weight loss   ENT ROS: negative for - epistaxis, headaches, nasal discharge, sore throat, vertigo, visual changes  Respiratory ROS: negative for - cough, hemoptysis, orthopnea, shortness of breath, wheezing  Cardiovascular ROS: see HPI  Gastrointestinal ROS: negative for - abdominal pain, blood in stools, constipation, diarrhea, heartburn, hematemesis, melena, nausea/vomiting  Genito-Urinary ROS: no dysuria, trouble voiding, or hematuria   Musculoskeletal ROS: negative for - joint pain, joint swelling, muscle pain, muscular weakness  Neurological ROS: negative for - confusion, dizziness, headaches, impaired coordination/balance, memory loss, numbness/tingling, seizures, speech problems, visual changes, weakness   Dermatological ROS: negative for - hair changes, nail changes, pruritus, or rash    PHYSICAL EXAM:  BP (!) 100/52   Pulse 76   Temp 97.6 °F (36.4 °C) (Temporal)   Resp 17   Ht 6' (1.829 m)   SpO2 96%   BMI 21.70 kg/m²     General Appearance:    Alert, cooperative, no distress, appears stated age    Head:    Normocephalic, without obvious abnormality, atraumatic    Eyes:    PERRL, conjunctiva/corneas clear, EOM's intact    Neck:   Supple, symmetrical, trachea midline; no carotid    bruit or JVP    Lungs:     Clear to auscultation bilaterally, respirations unlabored, no wheezing, rales or rhonchi Heart:    Regular rate and rhythm, no murmur, rub   or gallop    Abdomen:     Soft, non-tender, non-distended    Extremities:   Extremities normal, atraumatic, no cyanosis or edema    Skin:   Skin color, texture, turgor normal for age    Neurologic:   Oriented x3, CNII-XII intact. Normal gross strength and sensation       DATA:    ECG / Tele strips: please see HPI   TTE: pending       No intake or output data in the 24 hours ending 12/12/22 0930    Labs:   CBC:   Recent Labs     12/12/22 0222   WBC 8.8   HGB 15.0   HCT 46.7        BMP:   Recent Labs     12/12/22 0222      K 3.7   CO2 27   BUN 11   CREATININE 1.1   LABGLOM >60   CALCIUM 10.1     Mag:   Recent Labs     12/12/22 0222   MG 2.3     Phos: No results for input(s): PHOS in the last 72 hours. TSH:   Recent Labs     12/12/22 0222   TSH 4.190     HgA1c: No results found for: LABA1C  No results found for: EAG  proBNP: No results for input(s): PROBNP in the last 72 hours. PT/INR: No results for input(s): PROTIME, INR in the last 72 hours. APTT:No results for input(s): APTT in the last 72 hours. CARDIAC ENZYMES:No results for input(s): CKTOTAL, CKMB, CKMBINDEX, TROPONINI in the last 72 hours. FASTING LIPID PANEL:No results found for: CHOL, HDL, LDLDIRECT, LDLCALC, TRIG  LIVER PROFILE:No results for input(s): AST, ALT, LABALBU in the last 72 hours. Discussed with Dr. Grecia Fournier  Electronically signed by Denisha Salas PA-C on 12/12/2022 at 9:30 [place] [id number] Cardiology Consult       Mery Herrera    I have personally participated in a face-to-face and personally obtained history and performed physical exam on the date of service. I reviewed chart, vitals, labs and radiologic studies. I also participated in medical decision making with RICHY Harman on the date of service All of the assessments and recommendations are from me and I agree with all of the pertinent clinical information, assessment and treatment plan.  I have reviewed and edited the note above based on my findings during my history, exam, and decision making. Please see my additional contributions to the history, physical exam, assessment, and recommendations below. HISTORY OF PRESENT ILLNESS:     Reviewed, as above      Past medical history:  Reviewed, as above. Past surgical history:  Reviewed, as above. Current medications:  Reviewed, as above    Allergies:  Reviewed, as above    Social history:  Reviewed, as above    Family medical history:  Reviewed, as above. REVIEW OF SYSTEMS:   Reviewed, as above. PHYSICAL EXAM:   CONSTITUTIONAL:  awake, alert, cooperative, no apparent distress, and appears stated age  EYES:  lids and lashes normal and pupils equal, round and reactive to light, anicteric sclerae  HEAD:  normocepalic, without obvious abnormality, atraumatic, pink, moist mucous membranes. NECK:  Supple, symmetrical, trachea midline, no adenopathy, thyroid symmetric, not enlarged and no tenderness, skin normal  HEMATOLOGIC/LYMPHATICS:  no cervical lymphadenopathy and no supraclavicular lymphadenopathy  LUNGS:  No increased work of breathing, good air exchange, clear to auscultation bilaterally, no crackles or wheezing  CARDIOVASCULAR:  Normal apical impulse, regular rate and rhythm, normal S1 and S2, no S3 or S4, and no murmur noted and no JVD, no carotid bruit, no pedal edema, good carotid upstroke bilaterally. ABDOMEN:  Soft, nontender, no masses, no hepatomegaly or splenomegaly, BS+  CHEST: nontender to palpation, expands symmetrically  MUSCULOSKELETAL:  No clubbing no cyanosis. there is no redness, warmth, or swelling of the joints  full range of motion noted  NEUROLOGIC:  Alert, awake,oriented x3.   SKIN:  no bruising or bleeding, normal skin color, texture, turgor and no redness, warmth, or swelling    BP 99/65   Pulse 99   Temp (!) 95.6 °F (35.3 °C) (Temporal)   Resp 16   Ht 6' (1.829 m)   Wt 150 lb (68 kg)   SpO2 96%   BMI 20.34 kg/m² I/O last 3 completed shifts: In: 480 [P.O.:480]  Out: -   No intake/output data recorded. DATA:   I personally reviewed the admission EKG with the following interpretation: Wide-complex tachycardia    ECHO: Not performed to date  Stress Test: Not performed to date  Angiography: Not performed to date  Cardiology Labs: BMP:    Lab Results   Component Value Date/Time     12/13/2022 08:24 AM    K 4.0 12/13/2022 08:24 AM     12/13/2022 08:24 AM    CO2 21 12/13/2022 08:24 AM    BUN 9 12/13/2022 08:24 AM     CMP:    Lab Results   Component Value Date/Time     12/13/2022 08:24 AM    K 4.0 12/13/2022 08:24 AM     12/13/2022 08:24 AM    CO2 21 12/13/2022 08:24 AM    BUN 9 12/13/2022 08:24 AM    PROT 6.7 12/13/2022 08:24 AM     CBC:    Lab Results   Component Value Date/Time    WBC 4.2 12/13/2022 08:24 AM    RBC 5.22 12/13/2022 08:24 AM    HGB 14.7 12/13/2022 08:24 AM    HCT 44.7 12/13/2022 08:24 AM    MCV 85.6 12/13/2022 08:24 AM    RDW 12.0 12/13/2022 08:24 AM     12/13/2022 08:24 AM     PT/INR:  No results found for: PTINR  PT/INR Warfarin:  No components found for: PTPATWAR, PTINRWAR  PTT:  No results found for: APTT  PTT Heparin:  No components found for: APTTHEP  Magnesium:    Lab Results   Component Value Date/Time    MG 2.0 12/13/2022 08:24 AM     TSH:    Lab Results   Component Value Date/Time    TSH 4.190 12/12/2022 02:22 AM     TROPONIN:  No components found for: TROP  BNP:  No results found for: BNP  FASTING LIPID PANEL:  No results found for: CHOL, HDL, TRIG  XR CHEST PORTABLE   Final Result   No acute cardiopulmonary process.  [facility]    (Results Pending)       I have personally reviewed the laboratory, cardiac diagnostic and radiographic testing as outlined above:    IMPRESSION:  Wide-complex tachycardia: SVT with aberrancy versus VT, will consult electrophysiology for further evaluation.   Elevated troponin: Flat pattern, not consistent with ACS type I, as above. RECOMMENDATIONS:   Echocardiogram  Electrophysiology consultation  Basic metabolic panel and magnesium level in a.m. No active general cardiology problems, will see as needed, thank you    I have reviewed my findings and recommendations with patient    Thank you for the consult  Electronically signed by Janet Hart MD on 12/13/2022 at 12:54 PM    All of the above was discussed with the patient  reviewing the above stated recommendations. I directly participated in the medical-decision making, ordering tests, and medication adjustments as documented today. I contributed >50% to the overall encounter time including face-to-face time providing counseling and or coordination of care with the other providers, reviewing records/tests, counseling/education of the patient, ordering medications/tests/procedures, coordinating care, and documenting clinical information in the EHR. Time I spent with the family or surrogate(s) is included only if the patient was incapable of providing the necessary information or participating in medical decisions. I also discussed the differential diagnosis and all of the proposed management plans with the patient and individuals accompanying the patient. NOTE: This report was transcribed using voice recognition software.  Every effort was made to ensure accuracy; however, inadvertent computerized transcription errors may be present

## 2022-12-13 ENCOUNTER — APPOINTMENT (OUTPATIENT)
Dept: MRI IMAGING | Age: 22
End: 2022-12-13
Payer: COMMERCIAL

## 2022-12-13 PROBLEM — R79.89 ELEVATED TROPONIN: Status: ACTIVE | Noted: 2022-12-13

## 2022-12-13 PROBLEM — R77.8 ELEVATED TROPONIN: Status: ACTIVE | Noted: 2022-12-13

## 2022-12-13 LAB
ALBUMIN SERPL-MCNC: 4.4 G/DL (ref 3.5–5.2)
ALP BLD-CCNC: 82 U/L (ref 40–129)
ALT SERPL-CCNC: 16 U/L (ref 0–40)
ANION GAP SERPL CALCULATED.3IONS-SCNC: 12 MMOL/L (ref 7–16)
AST SERPL-CCNC: 20 U/L (ref 0–39)
BASOPHILS ABSOLUTE: 0.02 E9/L (ref 0–0.2)
BASOPHILS RELATIVE PERCENT: 0.5 % (ref 0–2)
BILIRUB SERPL-MCNC: 0.5 MG/DL (ref 0–1.2)
BUN BLDV-MCNC: 9 MG/DL (ref 6–20)
CALCIUM SERPL-MCNC: 9.8 MG/DL (ref 8.6–10.2)
CHLORIDE BLD-SCNC: 106 MMOL/L (ref 98–107)
CO2: 21 MMOL/L (ref 22–29)
CREAT SERPL-MCNC: 0.9 MG/DL (ref 0.7–1.2)
EKG ATRIAL RATE: 117 BPM
EKG ATRIAL RATE: 74 BPM
EKG P AXIS: 79 DEGREES
EKG P-R INTERVAL: 132 MS
EKG P-R INTERVAL: 142 MS
EKG Q-T INTERVAL: 276 MS
EKG Q-T INTERVAL: 374 MS
EKG QRS DURATION: 100 MS
EKG QRS DURATION: 106 MS
EKG QTC CALCULATION (BAZETT): 385 MS
EKG QTC CALCULATION (BAZETT): 415 MS
EKG R AXIS: 81 DEGREES
EKG R AXIS: 90 DEGREES
EKG T AXIS: 58 DEGREES
EKG T AXIS: 73 DEGREES
EKG VENTRICULAR RATE: 117 BPM
EKG VENTRICULAR RATE: 74 BPM
EOSINOPHILS ABSOLUTE: 0.12 E9/L (ref 0.05–0.5)
EOSINOPHILS RELATIVE PERCENT: 2.8 % (ref 0–6)
GFR SERPL CREATININE-BSD FRML MDRD: >60 ML/MIN/1.73
GLUCOSE BLD-MCNC: 114 MG/DL (ref 74–99)
HCT VFR BLD CALC: 44.7 % (ref 37–54)
HEMOGLOBIN: 14.7 G/DL (ref 12.5–16.5)
IMMATURE GRANULOCYTES #: 0.01 E9/L
IMMATURE GRANULOCYTES %: 0.2 % (ref 0–5)
LYMPHOCYTES ABSOLUTE: 1.29 E9/L (ref 1.5–4)
LYMPHOCYTES RELATIVE PERCENT: 30.6 % (ref 20–42)
MAGNESIUM: 2 MG/DL (ref 1.6–2.6)
MCH RBC QN AUTO: 28.2 PG (ref 26–35)
MCHC RBC AUTO-ENTMCNC: 32.9 % (ref 32–34.5)
MCV RBC AUTO: 85.6 FL (ref 80–99.9)
MONOCYTES ABSOLUTE: 0.36 E9/L (ref 0.1–0.95)
MONOCYTES RELATIVE PERCENT: 8.5 % (ref 2–12)
NEUTROPHILS ABSOLUTE: 2.42 E9/L (ref 1.8–7.3)
NEUTROPHILS RELATIVE PERCENT: 57.4 % (ref 43–80)
PDW BLD-RTO: 12 FL (ref 11.5–15)
PHOSPHORUS: 3.5 MG/DL (ref 2.5–4.5)
PLATELET # BLD: 226 E9/L (ref 130–450)
PMV BLD AUTO: 10.1 FL (ref 7–12)
POTASSIUM REFLEX MAGNESIUM: 4 MMOL/L (ref 3.5–5)
RBC # BLD: 5.22 E12/L (ref 3.8–5.8)
SODIUM BLD-SCNC: 139 MMOL/L (ref 132–146)
TOTAL PROTEIN: 6.7 G/DL (ref 6.4–8.3)
WBC # BLD: 4.2 E9/L (ref 4.5–11.5)

## 2022-12-13 PROCEDURE — 93010 ELECTROCARDIOGRAM REPORT: CPT | Performed by: INTERNAL MEDICINE

## 2022-12-13 PROCEDURE — 36415 COLL VENOUS BLD VENIPUNCTURE: CPT

## 2022-12-13 PROCEDURE — 75561 CARDIAC MRI FOR MORPH W/DYE: CPT

## 2022-12-13 PROCEDURE — 1200000000 HC SEMI PRIVATE

## 2022-12-13 PROCEDURE — 83735 ASSAY OF MAGNESIUM: CPT

## 2022-12-13 PROCEDURE — A9579 GAD-BASE MR CONTRAST NOS,1ML: HCPCS | Performed by: INTERNAL MEDICINE

## 2022-12-13 PROCEDURE — 2580000003 HC RX 258: Performed by: INTERNAL MEDICINE

## 2022-12-13 PROCEDURE — 99233 SBSQ HOSP IP/OBS HIGH 50: CPT | Performed by: STUDENT IN AN ORGANIZED HEALTH CARE EDUCATION/TRAINING PROGRAM

## 2022-12-13 PROCEDURE — 6360000004 HC RX CONTRAST MEDICATION: Performed by: INTERNAL MEDICINE

## 2022-12-13 PROCEDURE — 80053 COMPREHEN METABOLIC PANEL: CPT

## 2022-12-13 PROCEDURE — 84100 ASSAY OF PHOSPHORUS: CPT

## 2022-12-13 PROCEDURE — 75561 CARDIAC MRI FOR MORPH W/DYE: CPT | Performed by: INTERNAL MEDICINE

## 2022-12-13 PROCEDURE — 85025 COMPLETE CBC W/AUTO DIFF WBC: CPT

## 2022-12-13 PROCEDURE — 6360000002 HC RX W HCPCS: Performed by: INTERNAL MEDICINE

## 2022-12-13 RX ADMIN — ENOXAPARIN SODIUM 40 MG: 100 INJECTION SUBCUTANEOUS at 09:12

## 2022-12-13 RX ADMIN — Medication 10 ML: at 09:16

## 2022-12-13 RX ADMIN — GADOTERIDOL 30 ML: 279.3 INJECTION, SOLUTION INTRAVENOUS at 08:22

## 2022-12-13 RX ADMIN — Medication 10 ML: at 20:11

## 2022-12-13 NOTE — PROGRESS NOTES
[address] and Vascular [facility] Electrophysiology  Inpatient Progress Report  PATIENT: Wilfrid Dozier  MEDICAL RECORD NUMBER: [id number]  DATE OF SERVICE:  12/13/2022  ATTENDING ELECTROPHYSIOLOGIST: Herberth Rodriguez DO    PRIMARY ELECTROPHYSIOLOGIST: Sahil Pacheco MD   REFERRING PHYSICIAN: Rodolfo Branch MD (Inactive)  CHIEF COMPLAINT: Palpitations     HPI: This is a 23 y.o. male with a history of appendicitis S/p appendectomy. He notes no familial history of sudden cardiac death, no syncope. He has complained of palpitations since he was 17years old typically it were associated with exertion, during basketball or work, lasting 30 to 60 minutes at 1 point he approached his pediatrician regarding this issue an EKG was performed and was described as normal and thus no additional testing was performed. This episodes are self terminating without a vagal maneuvers associated with lightheadedness, chest discomfort and feeling of rapid heartbeat. He does drink alcohol, vape. 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 Yesterday (12/11/2022) at 2200 he was at work when he began to have palpitations these palpitations lasted for approximately 3 hours as it did not stop spontaneously he presented to the emergency department [facility] where he was found in a wide-complex tachycardia with a heart rate of 220 bpm, in the emergency department he was given IV lidocaine and spontaneously converted to sinus rhythm at that time. 12/13/2022: Patient is seen in hospital follow-up, at 9 35-9 37 he had recurrent narrow complex short RP tachycardia with a max rate of 154 bpm with a significant warm-up phase. No cardiac complaints at this time, cardiac MRI has been completed but not read he is agreeable to EP study, this was discussed with both his father and mother.         Patient Active Problem List   Diagnosis    Ventricular tachyarrhythmia    Palpitation Wide-complex tachycardia   who presents to the hospital complaining of palpitations, heart racing. Cardiac electrophysiology service is consulted for wide-complex tachycardia, suspected SVT. Patient Active Problem List    Diagnosis Date Noted    Ventricular tachyarrhythmia 12/12/2022     Priority: Medium    Palpitation 12/12/2022     Priority: Medium    Wide-complex tachycardia 12/12/2022     Priority: Medium       No past medical history on file. No family history on file. Social History     Tobacco Use    Smoking status: Never    Smokeless tobacco: Never   Substance Use Topics    Alcohol use: Not Currently       Current Facility-Administered Medications   Medication Dose Route Frequency Provider Last Rate Last Admin    sodium chloride flush 0.9 % injection 10 mL  10 mL IntraVENous 2 times per day James Spence MD   10 mL at 12/13/22 0916    sodium chloride flush 0.9 % injection 10 mL  10 mL IntraVENous PRN James Spence MD        0.9 % sodium chloride infusion   IntraVENous PRN Rebecca Pope MD        ondansetron (ZOFRAN-ODT) disintegrating tablet 4 mg  4 mg Oral Q8H PRN James Spence MD        Or    ondansetron [facility]) injection 4 mg  4 mg IntraVENous Q6H PRN James Spence MD        magnesium hydroxide (MILK OF MAGNESIA) 400 MG/5ML suspension 30 mL  30 mL Oral Daily PRN James Spence MD        acetaminophen (TYLENOL) tablet 650 mg  650 mg Oral Q6H PRN James Spence MD        Or    acetaminophen (TYLENOL) suppository 650 mg  650 mg Rectal Q6H PRN James Spence MD            No Known Allergies    ROS:   Constitutional: Negative for fever, activity change and appetite change. HENT: Negative for epistaxis. Eyes: Negative for diploplia, blurred vision. Respiratory: Negative for cough, chest tightness, shortness of breath and wheezing. Cardiovascular: pertinent positives in HPI  Gastrointestinal: Negative for abdominal pain and blood in stool.    All other review of systems are negative     PHYSICAL EXAM:   Vitals:    12/12/22 1524 12/12/22 1929 12/12/22 1930 12/13/22 0800   BP: (!) 90/58 101/69  99/65   Pulse: 81 97  99   Resp: 18 18  16   Temp: 96.8 °F (36 °C) 97.8 °F (36.6 °C)  (!) 95.6 °F (35.3 °C)   TempSrc: Temporal Temporal  Temporal   SpO2: 91% 99%  96%   Weight:   150 lb (68 kg)    Height:   6' (1.829 m)       Constitutional: Well-developed, no acute distress, father at bedside. Eyes: conjunctivae normal, no xanthelasma   Ears, Nose, Throat: oral mucosa moist, no cyanosis   CV: no JVD. Regular rate and rhythm. Normal S1S2 and no S3. No murmurs, rubs, or gallops. PMI is nondisplaced  Lungs: clear to auscultation bilaterally, normal respiratory effort without used of accessory muscles  Abdomen: soft, non-tender, bowel sounds present, no masses or hepatomegaly   Musculoskeletal: no digital clubbing, no edema   Skin: warm, no rashes     I have personally reviewed the laboratory, cardiac diagnostic and radiographic testing as outlined below:    Data:    Recent Labs     12/12/22 0222 12/13/22  0824   WBC 8.8 4.2*   HGB 15.0 14.7   HCT 46.7 44.7    226     Recent Labs     12/12/22 0222 12/13/22  0824    139   K 3.7 4.0    106   CO2 27 21*   BUN 11 9   CREATININE 1.1 0.9   CALCIUM 10.1 9.8      Lab Results   Component Value Date/Time    MG 2.0 12/13/2022 08:24 AM     Recent Labs     12/12/22 0222   TSH 4.190     No results for input(s): INR in the last 72 hours. CXR 12/12/2022: The lungs are clear without focal consolidation, large pleural effusion, or   pneumothorax. The cardiomediastinal silhouette is stable. Impression   No acute cardiopulmonary process. Telemetry: No rhythm strips of conversion of WCT to sinus were saved, ongoing sinus rhythm    EKG 12/12/2022: WCT with a rate of 222 bpm, QRS 162ms, suspected SVT with aberrancy  Please see scan in Cardiology.         EKG 12/12/2022  NSR no delta waves rate 92 BPM, , QTc 415ms Echocardiogram 12/12/2022:   Left Ventricle   Left ventricle size is normal.   Normal left ventricular wall thickness. Ejection fraction is visually estimated at 55%. No regional wall motion abnormalities seen. Normal left ventricular diastolic filling pattern for age. No evidence of left ventricular mass or thrombus noted. Left ventricle false cord(normal variant). Right Ventricle   Normal right ventricular size and function. Left Atrium   Left atrium is of normal size. Interatrial septum appears intact. Right Atrium   Normal right atrium size. Mitral Valve   Structurally normal mitral valve. No evidence of mitral valve stenosis. No evidence of mitral regurgitaton. Tricuspid Valve   The tricuspid valve appears structurally normal.   Physiologic and/or trace tricuspid regurgitation. RVSP is normal.      Aortic Valve   Aortic valve opens well. No evidence of aortic valve regurgitation. No   hemodynamically significant aortic stenosis is present. Pulmonic Valve   The pulmonic valve was not well visualized. No evidence of any pulmonic regurgitation. Pericardial Effusion   No evidence of pericardial effusion. Aorta   Aortic root within normal limits. Conclusions      Summary   No previous echo for comparison. Technically adequate study. Essentially   normal study. Signature      ----------------------------------------------------------------   Electronically signed by Tamera Scales MD(Interpreting   physician) on 12/12/2022 04:34 PM   ----------------------------------------------------------------       Cardiac MRI pending. I have independently reviewed all of the ECGs and rhythm strips per above     Assessment/Plan:      1. Wide complex tachycardia   - Suspect SVT with aberrancy. - Ongoing complaints since 17years of age, no prior syncope nor SCD in the Family. - Terminated with IV Lidocaine Adenosine not tried (12/11/2022).    - Normal echo 12/12/2022      Recommendations:  Await read Cardiac MRI   Possible EP study pending the results of the above testing will plan for the afternoon on 12/14/2022. At this time no indication for ischemic testing. Avoid any medication that would affect the EP study. EP will follow. I have spent a total of 10 minutes with the patient and the family reviewing the above stated recommendations. And a total of >50% of that time involved face-to-face time providing counseling and or coordination of care with the other providers. Thank you for allowing me to participate in your patient's care. Please call me if there are any questions or concerns. Discussed with Dr. Mariaa Cronin. Deysi De, JO - CNP  Cardiac Electrophysiology  [facility]) Physicians  The Heart and Vascular [place]: [place] Electrophysiology  12:48 PM  12/13/2022    Attending Supervising Physicians [address] [place] [address] Rd Statement  I performed at least 51% of the work. I was present with the nurse practitioner during the history and exam. I discussed the findings and plans with the nurse practitioner and agree as documented in his note     Marie Beth is a 23 y.o. male with a history of palpitations and appendicitis sp appendectomy. He is managed by PCP with no cardiac medications. On 12/11/22, he presented with chief complaint palpitations and chest discomfort. He was diagnosed with WCT at 220 bpm, which terminated with IV lidocaine infusion. EP service was consulted. Patient reports similar events for ~6 years, which he describes as triggered by activity, duration of 30-60 minutes, and resolve without intervention. He denies any history of syncope. He denies any family history of SCD. TTE grossly normal. cMRI grossly normal. He denies any complaints at this time. A/P:  1. WCT  - 220 bpm terminated with IV lidocaine.  - TTE and cMRI grossly normal.  - Appears SVT based on Brugada criteria.   - Plan for EP study on 12/14/22 with  Syl CHAKRABORTY at midnight.     Electronically signed by Lucien Srivastava DO on 12/13/22 at 10:58 PM EST

## 2022-12-13 NOTE — PLAN OF CARE
Problem: Chronic Conditions and Co-morbidities  Goal: Patient's chronic conditions and co-morbidity symptoms are monitored and maintained or improved  Outcome: Progressing     Problem: Safety - Adult  Goal: Free from fall injury  Outcome: Progressing     Problem: Discharge Planning  Goal: Discharge to home or other facility with appropriate resources  Outcome: Progressing     Problem: Cardiovascular - Adult  Goal: Absence of cardiac dysrhythmias or at baseline  Outcome: Progressing

## 2022-12-13 NOTE — PROGRESS NOTES
Perfect served Dr Todd Holden re: pt's heart rate 140-150s ST when ambulating to bathroom.  Returned to 90's-105 at rest.

## 2022-12-13 NOTE — CARE COORDINATION
12/13 Care Coordination: Pt presented to the emergency department for evaluation of Palpitations . Patient states he drank an energy drink at noon and has since had palpitations. Patient's initial EKG showed that he is in ventricular tachycardia and rate of 220. He was given 100 mg of lidocaine with conversion into sinus tach. Cardiology /EP consult. For Cardiac MRI. No needs identified at this time. CM/SW will continue to follow for discharge planning.    Alex Stands BSN,RN-CV-BC  [phone]

## 2022-12-13 NOTE — PROGRESS NOTES
Hospitalist Progress Note      PCP: Best Quan MD (Inactive)    Date of Admission: 12/12/2022      Hospital Course: This is a 70-year-old male with no significant past medical history who  has no past medical history on file. presents with palpitation started 4 hr PTA, was at work, had intermittent similar episodes since age 17 but this was longest he experienced for approximately 30min. Denies drug use, no fever or chills, felt some chest discomfort, no nausea or vomiting no SOB no leg swelling. EKG was done in ER showed wide complex tachycardia VT, given lidocaine and converted currently in normal sinus rhythm. Subjective: Pt was seen and examined at bedside. No acute event overnight; denies any CP, SOB or palpitation. Medications:  Reviewed    Infusion Medications    sodium chloride       Scheduled Medications    sodium chloride flush  10 mL IntraVENous 2 times per day     PRN Meds: sodium chloride flush, sodium chloride, ondansetron **OR** ondansetron, magnesium hydroxide, acetaminophen **OR** acetaminophen      Intake/Output Summary (Last 24 hours) at 12/13/2022 1122  Last data filed at 12/12/2022 1700  Gross per 24 hour   Intake 480 ml   Output --   Net 480 ml       Exam:    BP 99/65   Pulse 99   Temp (!) 95.6 °F (35.3 °C) (Temporal)   Resp 16   Ht 6' (1.829 m)   Wt 150 lb (68 kg)   SpO2 96%   BMI 20.34 kg/m²     General appearance: Sitting comfortably in bed. No apparent distress. Respiratory: Clear to auscultation bilaterally. Cardiovascular: Normal S1/S2. Regular rhythm and rate. Abdomen: Soft, non-tender, non-distended with normal bowel sounds. Musculoskeletal: No clubbing, cyanosis or edema bilaterally. Skin: Skin color, texture, turgor normal.  No rashes or lesions. Neurologic:  No focal deficit.   Psychiatric: Alert and oriented, thought content appropriate, normal insight  Peripheral Pulses: +2 palpable, equal bilaterally       Labs:   Recent Labs     12/12/22  9029 12/13/22  0824   WBC 8.8 4.2*   HGB 15.0 14.7   HCT 46.7 44.7    226     Recent Labs     12/12/22  0222 12/13/22  0824    139   K 3.7 4.0    106   CO2 27 21*   BUN 11 9   CREATININE 1.1 0.9   CALCIUM 10.1 9.8   PHOS  --  3.5     Recent Labs     12/13/22  0824   AST 20   ALT 16   BILITOT 0.5   ALKPHOS 82     No results for input(s): INR in the last 72 hours. No results for input(s): Ivette Fuse in the last 72 hours. Radiology:  XR CHEST PORTABLE   Final Result   No acute cardiopulmonary process. MRI CARDIAC W WO CONTRAST    (Results Pending)             Active Hospital Problems    Diagnosis Date Noted    Ventricular tachyarrhythmia [I47.20] 12/12/2022     Priority: Medium    Palpitation [R00.2] 12/12/2022     Priority: Medium    Wide-complex tachycardia [R00.0] 12/12/2022     Priority: Medium       Assessment  Ventricular tachyarrhythmia - wide complex tachycardic likely SVT with aberrancy    Chest pain - secondary to above, ACS not likely  Elevated Troponin - likely due to demand ischemia in the setting of rapid heart rate  Palpitation    Plan:  Continue with tele monitor  Electrophysiologist on board, cardiac MRI ordered  Possible EP study depending on MRI finding   Activity as tolerated      DVT Prophylaxis:     Diet: ADULT DIET;  Regular  Diet NPO Exceptions are: Sips of Water with Meds, Ice Chips  Code Status: Full Code    PT/OT Eval Status: N/A    Dispo - Home once stable    Kimberly Cooley MD 12/13/2022 11:22 AM

## 2022-12-14 LAB
MAGNESIUM: 2.1 MG/DL (ref 1.6–2.6)
PHOSPHORUS: 5.8 MG/DL (ref 2.5–4.5)

## 2022-12-14 PROCEDURE — 36415 COLL VENOUS BLD VENIPUNCTURE: CPT

## 2022-12-14 PROCEDURE — 2580000003 HC RX 258: Performed by: INTERNAL MEDICINE

## 2022-12-14 PROCEDURE — 84100 ASSAY OF PHOSPHORUS: CPT

## 2022-12-14 PROCEDURE — 1200000000 HC SEMI PRIVATE

## 2022-12-14 PROCEDURE — 99233 SBSQ HOSP IP/OBS HIGH 50: CPT | Performed by: STUDENT IN AN ORGANIZED HEALTH CARE EDUCATION/TRAINING PROGRAM

## 2022-12-14 PROCEDURE — 83735 ASSAY OF MAGNESIUM: CPT

## 2022-12-14 RX ADMIN — Medication 10 ML: at 20:01

## 2022-12-14 NOTE — PROGRESS NOTES
[address] and Vascular [facility] Electrophysiology  Inpatient Progress Report  PATIENT: David Green  MEDICAL RECORD NUMBER: [id number]  DATE OF SERVICE:  12/14/2022  ATTENDING ELECTROPHYSIOLOGIST: Anuel Rolle DO    PRIMARY ELECTROPHYSIOLOGIST: Farhan Saravia MD   REFERRING PHYSICIAN: Brunilda Mariscal MD (Inactive)  CHIEF COMPLAINT: Palpitations     HPI: This is a 23 y.o. male with a history of appendicitis S/p appendectomy. He notes no familial history of sudden cardiac death, no syncope. He has complained of palpitations since he was 17years old typically it were associated with exertion, during basketball or work, lasting 30 to 60 minutes at 1 point he approached his pediatrician regarding this issue an EKG was performed and was described as normal and thus no additional testing was performed. This episodes are self terminating without a vagal maneuvers associated with lightheadedness, chest discomfort and feeling of rapid heartbeat. He does drink alcohol, vape. 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 12/13/2022: Patient is seen in hospital follow-up, at 9 35-9 37 he had recurrent narrow complex short RP tachycardia with a max rate of 154 bpm with a significant warm-up phase. No cardiac complaints at this time, cardiac MRI has been completed but not read he is agreeable to EP study, this was discussed with both his father and mother. 12/14/22: stable on monitor today.  Was NPO and plan for EP study today but delayed due to EP HENT: Negative for epistaxis. Eyes: Negative for diploplia, blurred vision. Respiratory: Negative for cough, chest tightness, shortness of breath and wheezing. Cardiovascular: pertinent positives in HPI  Gastrointestinal: Negative for abdominal pain and blood in stool. All other review of systems are negative     PHYSICAL EXAM:   Vitals:    12/14/22 0135 12/14/22 0849 12/14/22 0850 12/14/22 1621   BP: (!) 99/58 (!) 85/59 101/67 120/74   Pulse: (!) 102 (!) 102 100 98   Resp: 16 12  16   Temp: 97.1 °F (36.2 °C) 97.5 °F (36.4 °C)  98.1 °F (36.7 °C)   TempSrc: Temporal Oral  Temporal   SpO2: 98% 97% 97% 98%   Weight:    150 lb (68 kg)   Height:    6' (1.829 m)      Constitutional: Well-developed, no acute distress, father at bedside. Eyes: conjunctivae normal, no xanthelasma   Ears, Nose, Throat: oral mucosa moist, no cyanosis   CV: no JVD. Regular rate and rhythm. Normal S1S2 and no S3. No murmurs, rubs, or gallops. PMI is nondisplaced  Lungs: clear to auscultation bilaterally, normal respiratory effort without used of accessory muscles  Abdomen: soft, non-tender, bowel sounds present, no masses or hepatomegaly   Musculoskeletal: no digital clubbing, no edema   Skin: warm, no rashes     I have personally reviewed the laboratory, cardiac diagnostic and radiographic testing as outlined below:    Data:    Recent Labs     12/12/22 0222 12/13/22 0824   WBC 8.8 4.2*   HGB 15.0 14.7   HCT 46.7 44.7    226     Recent Labs     12/12/22 0222 12/13/22 0824    139   K 3.7 4.0    106   CO2 27 21*   BUN 11 9   CREATININE 1.1 0.9   CALCIUM 10.1 9.8      Lab Results   Component Value Date/Time    MG 2.1 12/14/2022 07:27 AM     Recent Labs     12/12/22 0222   TSH 4.190     No results for input(s): INR in the last 72 hours. CXR 12/12/2022: The lungs are clear without focal consolidation, large pleural effusion, or   pneumothorax. The cardiomediastinal silhouette is stable.            Impression No acute cardiopulmonary process. Telemetry: No rhythm strips of conversion of WCT to sinus were saved, ongoing sinus rhythm    EKG 12/12/2022: WCT with a rate of 222 bpm, QRS 162ms, suspected SVT with aberrancy  Please see scan in Cardiology. EKG 12/12/2022  NSR no delta waves rate 92 BPM, , QTc 415ms       Echocardiogram 12/12/2022:   Left Ventricle   Left ventricle size is normal.   Normal left ventricular wall thickness. Ejection fraction is visually estimated at 55%. No regional wall motion abnormalities seen. Normal left ventricular diastolic filling pattern for age. No evidence of left ventricular mass or thrombus noted. Left ventricle false cord(normal variant). Right Ventricle   Normal right ventricular size and function. Left Atrium   Left atrium is of normal size. Interatrial septum appears intact. Right Atrium   Normal right atrium size. Mitral Valve   Structurally normal mitral valve. No evidence of mitral valve stenosis. No evidence of mitral regurgitaton. Tricuspid Valve   The tricuspid valve appears structurally normal.   Physiologic and/or trace tricuspid regurgitation. RVSP is normal.      Aortic Valve   Aortic valve opens well. No evidence of aortic valve regurgitation. No   hemodynamically significant aortic stenosis is present. Pulmonic Valve   The pulmonic valve was not well visualized. No evidence of any pulmonic regurgitation. Pericardial Effusion   No evidence of pericardial effusion. Aorta   Aortic root within normal limits. Conclusions      Summary   No previous echo for comparison. Technically adequate study. Essentially   normal study.       Signature      ----------------------------------------------------------------   Electronically signed by Shawn Kennedy MD(Interpreting   physician) on 12/12/2022 04:34 PM   ----------------------------------------------------------------       Cardiac MRI 12/12/22 Impression   1. Normal LV size and function, LVEF 57%. 2.  No regional wall motion abnormalities. 3.  Normal RV size and function. 4.  No significant valvular heart disease. 5.  No evidence of intramyocardial fibrosis, inflammatory pattern,   infiltrative disease or prior infarct. 6.  No evidence of pericardial effusion. José Antonio March MD, Larene Peabody, [address] cardiology         I have independently reviewed all of the ECGs and rhythm strips per above     Assessment/Plan:      1. Wide complex tachycardia   - Suspect SVT with aberrancy. - Ongoing complaints since 17years of age, no prior syncope nor SCD in the Family. - Terminated with IV Lidocaine Adenosine not tried (12/11/2022). - Normal echo 12/12/2022      Recommendations:  Cardiac MRI WNL  Plan for EP study with possible ablation on 12/15/2022- unable to perform on 12/14/22 due to availability of anesthesia/staffing     At this time no indication for ischemic testing. Avoid any medication that would affect the EP study. NPO after midnight     I have spent a total of 10 minutes with the patient and the family reviewing the above stated recommendations. And a total of >50% of that time involved face-to-face time providing counseling and or coordination of care with the other providers. Thank you for allowing me to participate in your patient's care. Please call me if there are any questions or concerns. Discussed with Dr. Akbar Araiza. JO Mayorga - CNP  Cardiac Electrophysiology  [facility]) Physicians  The Heart and Vascular [place]: [place] Electrophysiology  5:06 PM  12/14/2022      Attending Supervising Physicians [address] Statement  I performed at least 51% of the work.  I was present with the nurse practitioner during the history and exam. I discussed the findings and plans with the nurse practitioner and agree as documented in her note     Gerhardt Porto is a 23 y.o. male with a history of

## 2022-12-15 ENCOUNTER — APPOINTMENT (OUTPATIENT)
Dept: CARDIAC CATH/INVASIVE PROCEDURES | Age: 22
End: 2022-12-15
Payer: COMMERCIAL

## 2022-12-15 ENCOUNTER — ANESTHESIA EVENT (OUTPATIENT)
Dept: CARDIAC CATH/INVASIVE PROCEDURES | Age: 22
End: 2022-12-15
Payer: COMMERCIAL

## 2022-12-15 ENCOUNTER — ANESTHESIA (OUTPATIENT)
Dept: CARDIAC CATH/INVASIVE PROCEDURES | Age: 22
End: 2022-12-15
Payer: COMMERCIAL

## 2022-12-15 PROBLEM — R07.9 CHEST PAIN: Status: ACTIVE | Noted: 2022-12-15

## 2022-12-15 LAB
ACTIVATED CLOTTING TIME: 182 SECONDS (ref 99–130)
ACTIVATED CLOTTING TIME: 212 SECONDS (ref 99–130)
MAGNESIUM: 2.3 MG/DL (ref 1.6–2.6)
PHOSPHORUS: 5.3 MG/DL (ref 2.5–4.5)

## 2022-12-15 PROCEDURE — C1733 CATH, EP, OTHR THAN COOL-TIP: HCPCS

## 2022-12-15 PROCEDURE — 2709999900 HC NON-CHARGEABLE SUPPLY

## 2022-12-15 PROCEDURE — 6360000002 HC RX W HCPCS: Performed by: NURSE ANESTHETIST, CERTIFIED REGISTERED

## 2022-12-15 PROCEDURE — 93623 PRGRMD STIMJ&PACG IV RX NFS: CPT

## 2022-12-15 PROCEDURE — 2580000003 HC RX 258: Performed by: INTERNAL MEDICINE

## 2022-12-15 PROCEDURE — C1759 CATH, INTRA ECHOCARDIOGRAPHY: HCPCS

## 2022-12-15 PROCEDURE — 3700000000 HC ANESTHESIA ATTENDED CARE

## 2022-12-15 PROCEDURE — 6360000002 HC RX W HCPCS

## 2022-12-15 PROCEDURE — 93462 L HRT CATH TRNSPTL PUNCTURE: CPT

## 2022-12-15 PROCEDURE — 93613 INTRACARDIAC EPHYS 3D MAPG: CPT | Performed by: INTERNAL MEDICINE

## 2022-12-15 PROCEDURE — 93620 COMP EP EVL R AT VEN PAC&REC: CPT | Performed by: INTERNAL MEDICINE

## 2022-12-15 PROCEDURE — 2580000003 HC RX 258: Performed by: NURSE ANESTHETIST, CERTIFIED REGISTERED

## 2022-12-15 PROCEDURE — 93662 INTRACARDIAC ECG (ICE): CPT

## 2022-12-15 PROCEDURE — C1730 CATH, EP, 19 OR FEW ELECT: HCPCS

## 2022-12-15 PROCEDURE — C1893 INTRO/SHEATH, FIXED,NON-PEEL: HCPCS

## 2022-12-15 PROCEDURE — 93653 COMPRE EP EVAL TX SVT: CPT | Performed by: INTERNAL MEDICINE

## 2022-12-15 PROCEDURE — 84100 ASSAY OF PHOSPHORUS: CPT

## 2022-12-15 PROCEDURE — 2500000003 HC RX 250 WO HCPCS

## 2022-12-15 PROCEDURE — 36415 COLL VENOUS BLD VENIPUNCTURE: CPT

## 2022-12-15 PROCEDURE — C1766 INTRO/SHEATH,STRBLE,NON-PEEL: HCPCS

## 2022-12-15 PROCEDURE — 2140000000 HC CCU INTERMEDIATE R&B

## 2022-12-15 PROCEDURE — 85347 COAGULATION TIME ACTIVATED: CPT

## 2022-12-15 PROCEDURE — C1894 INTRO/SHEATH, NON-LASER: HCPCS

## 2022-12-15 PROCEDURE — 93623 PRGRMD STIMJ&PACG IV RX NFS: CPT | Performed by: INTERNAL MEDICINE

## 2022-12-15 PROCEDURE — 3700000001 HC ADD 15 MINUTES (ANESTHESIA)

## 2022-12-15 PROCEDURE — 2580000003 HC RX 258

## 2022-12-15 PROCEDURE — 93653 COMPRE EP EVAL TX SVT: CPT

## 2022-12-15 PROCEDURE — 83735 ASSAY OF MAGNESIUM: CPT

## 2022-12-15 RX ORDER — MIDAZOLAM HYDROCHLORIDE 1 MG/ML
INJECTION INTRAMUSCULAR; INTRAVENOUS PRN
Status: DISCONTINUED | OUTPATIENT
Start: 2022-12-15 | End: 2022-12-15 | Stop reason: SDUPTHER

## 2022-12-15 RX ORDER — HEPARIN SODIUM 1000 [USP'U]/ML
INJECTION, SOLUTION INTRAVENOUS; SUBCUTANEOUS PRN
Status: DISCONTINUED | OUTPATIENT
Start: 2022-12-15 | End: 2022-12-15 | Stop reason: SDUPTHER

## 2022-12-15 RX ORDER — DEXTROSE MONOHYDRATE 50 MG/ML
INJECTION, SOLUTION INTRAVENOUS CONTINUOUS PRN
Status: DISCONTINUED | OUTPATIENT
Start: 2022-12-15 | End: 2022-12-15 | Stop reason: SDUPTHER

## 2022-12-15 RX ORDER — PROPOFOL 10 MG/ML
INJECTION, EMULSION INTRAVENOUS PRN
Status: DISCONTINUED | OUTPATIENT
Start: 2022-12-15 | End: 2022-12-15 | Stop reason: SDUPTHER

## 2022-12-15 RX ORDER — SODIUM CHLORIDE 9 MG/ML
INJECTION, SOLUTION INTRAVENOUS PRN
Status: DISCONTINUED | OUTPATIENT
Start: 2022-12-15 | End: 2022-12-16 | Stop reason: HOSPADM

## 2022-12-15 RX ORDER — SODIUM CHLORIDE 9 MG/ML
INJECTION, SOLUTION INTRAVENOUS CONTINUOUS PRN
Status: DISCONTINUED | OUTPATIENT
Start: 2022-12-15 | End: 2022-12-15 | Stop reason: SDUPTHER

## 2022-12-15 RX ORDER — SODIUM CHLORIDE 0.9 % (FLUSH) 0.9 %
5-40 SYRINGE (ML) INJECTION PRN
Status: DISCONTINUED | OUTPATIENT
Start: 2022-12-15 | End: 2022-12-16 | Stop reason: HOSPADM

## 2022-12-15 RX ORDER — FENTANYL CITRATE 50 UG/ML
INJECTION, SOLUTION INTRAMUSCULAR; INTRAVENOUS PRN
Status: DISCONTINUED | OUTPATIENT
Start: 2022-12-15 | End: 2022-12-15 | Stop reason: SDUPTHER

## 2022-12-15 RX ORDER — VANCOMYCIN HYDROCHLORIDE 1 G/20ML
INJECTION, POWDER, LYOPHILIZED, FOR SOLUTION INTRAVENOUS PRN
Status: DISCONTINUED | OUTPATIENT
Start: 2022-12-15 | End: 2022-12-15 | Stop reason: SDUPTHER

## 2022-12-15 RX ORDER — SODIUM CHLORIDE 0.9 % (FLUSH) 0.9 %
5-40 SYRINGE (ML) INJECTION EVERY 12 HOURS SCHEDULED
Status: DISCONTINUED | OUTPATIENT
Start: 2022-12-15 | End: 2022-12-16 | Stop reason: HOSPADM

## 2022-12-15 RX ADMIN — FENTANYL CITRATE 25 MCG: 50 INJECTION, SOLUTION INTRAMUSCULAR; INTRAVENOUS at 14:43

## 2022-12-15 RX ADMIN — FENTANYL CITRATE 50 MCG: 50 INJECTION, SOLUTION INTRAMUSCULAR; INTRAVENOUS at 13:06

## 2022-12-15 RX ADMIN — Medication 10 ML: at 08:30

## 2022-12-15 RX ADMIN — FENTANYL CITRATE 50 MCG: 50 INJECTION, SOLUTION INTRAMUSCULAR; INTRAVENOUS at 13:48

## 2022-12-15 RX ADMIN — SODIUM CHLORIDE: 9 INJECTION, SOLUTION INTRAVENOUS at 10:40

## 2022-12-15 RX ADMIN — SODIUM CHLORIDE: 9 INJECTION, SOLUTION INTRAVENOUS at 15:34

## 2022-12-15 RX ADMIN — FENTANYL CITRATE 25 MCG: 50 INJECTION, SOLUTION INTRAMUSCULAR; INTRAVENOUS at 14:39

## 2022-12-15 RX ADMIN — FENTANYL CITRATE 50 MCG: 50 INJECTION, SOLUTION INTRAMUSCULAR; INTRAVENOUS at 16:52

## 2022-12-15 RX ADMIN — PROPOFOL 200 MG: 10 INJECTION, EMULSION INTRAVENOUS at 11:07

## 2022-12-15 RX ADMIN — FENTANYL CITRATE 25 MCG: 50 INJECTION, SOLUTION INTRAMUSCULAR; INTRAVENOUS at 16:18

## 2022-12-15 RX ADMIN — Medication 10 ML: at 22:42

## 2022-12-15 RX ADMIN — SODIUM CHLORIDE: 9 INJECTION, SOLUTION INTRAVENOUS at 15:36

## 2022-12-15 RX ADMIN — VANCOMYCIN HYDROCHLORIDE 1000 MG: 1 INJECTION, POWDER, LYOPHILIZED, FOR SOLUTION INTRAVENOUS at 15:25

## 2022-12-15 RX ADMIN — SODIUM CHLORIDE: 9 INJECTION, SOLUTION INTRAVENOUS at 11:43

## 2022-12-15 RX ADMIN — MIDAZOLAM 2 MG: 1 INJECTION INTRAMUSCULAR; INTRAVENOUS at 13:06

## 2022-12-15 RX ADMIN — Medication 10 ML: at 22:43

## 2022-12-15 RX ADMIN — FENTANYL CITRATE 25 MCG: 50 INJECTION, SOLUTION INTRAMUSCULAR; INTRAVENOUS at 15:07

## 2022-12-15 RX ADMIN — PROPOFOL 50 MCG/KG/MIN: 10 INJECTION, EMULSION INTRAVENOUS at 13:06

## 2022-12-15 RX ADMIN — HEPARIN SODIUM 7200 UNITS: 1000 INJECTION, SOLUTION INTRAVENOUS; SUBCUTANEOUS at 15:33

## 2022-12-15 RX ADMIN — DEXTROSE MONOHYDRATE: 50 INJECTION, SOLUTION INTRAVENOUS at 15:25

## 2022-12-15 ASSESSMENT — LIFESTYLE VARIABLES: SMOKING_STATUS: 1

## 2022-12-15 NOTE — PROGRESS NOTES
Hospitalist Progress Note      PCP: Willow Marion MD (Inactive)    Date of Admission: 12/12/2022      Hospital Course: This is a 28-year-old male with no significant past medical history who  has no past medical history on file. presents with palpitation started 4 hr PTA, was at work, had intermittent similar episodes since age 17 but this was longest he experienced for approximately 30min. Denies drug use, no fever or chills, felt some chest discomfort, no nausea or vomiting no SOB no leg swelling. EKG was done in ER showed wide complex tachycardia VT, given lidocaine and converted to normal sinus rhythm. Subjective: Pt was seen and examined at bedside. No acute event overnight; denies any CP, SOB or palpitation. Medications:  Reviewed    Infusion Medications    sodium chloride       Scheduled Medications    sodium chloride flush  10 mL IntraVENous 2 times per day     PRN Meds: sodium chloride flush, sodium chloride, ondansetron **OR** ondansetron, magnesium hydroxide, acetaminophen **OR** acetaminophen    No intake or output data in the 24 hours ending 12/15/22 1038      Exam:    BP 96/72   Pulse 80   Temp 97.7 °F (36.5 °C) (Temporal)   Resp 16   Ht 6' (1.829 m)   Wt 160 lb (72.6 kg)   SpO2 100%   BMI 21.70 kg/m²     General appearance: Sitting comfortably in bed. No apparent distress. Respiratory: Clear to auscultation bilaterally. Cardiovascular: Normal S1/S2. Regular rhythm and rate. Abdomen: Soft, non-tender, non-distended with normal bowel sounds. Musculoskeletal: No clubbing, cyanosis or edema bilaterally. Skin: Skin color, texture, turgor normal.  No rashes or lesions. Neurologic:  No focal deficit.   Psychiatric: Alert and oriented, thought content appropriate, normal insight  Peripheral Pulses: +2 palpable, equal bilaterally       Labs:   Recent Labs     12/13/22  0824   WBC 4.2*   HGB 14.7   HCT 44.7          Recent Labs     12/13/22  0824 12/14/22  0727 12/15/22  0633     --   --    K 4.0  --   --      --   --    CO2 21*  --   --    BUN 9  --   --    CREATININE 0.9  --   --    CALCIUM 9.8  --   --    PHOS 3.5 5.8* 5.3*       Recent Labs     12/13/22  0824   AST 20   ALT 16   BILITOT 0.5   ALKPHOS 82       No results for input(s): INR in the last 72 hours. No results for input(s): Bonifacioab Ngo in the last 72 hours. Radiology:  MRI CARDIAC W WO CONTRAST   Final Result   1. Normal LV size and function, LVEF 57%. 2.  No regional wall motion abnormalities. 3.  Normal RV size and function. 4.  No significant valvular heart disease. 5.  No evidence of intramyocardial fibrosis, inflammatory pattern,   infiltrative disease or prior infarct. 6.  No evidence of pericardial effusion. Kristen March MD, [facility] cardiology      XR CHEST PORTABLE   Final Result   No acute cardiopulmonary process.                    Active Hospital Problems    Diagnosis Date Noted    Elevated troponin [R77.8] 12/13/2022     Priority: Medium    Ventricular tachyarrhythmia [I47.20] 12/12/2022     Priority: Medium    Palpitation [R00.2] 12/12/2022     Priority: Medium    Wide-complex tachycardia [R00.0] 12/12/2022     Priority: Medium       Assessment  Ventricular tachyarrhythmia - wide complex tachycardic likely SVT with aberrancy    Chest pain - secondary to above, ACS not likely  Elevated Troponin - likely due to demand ischemia in the setting of rapid heart rate  Palpitation      Plan:  Continue with tele monitor  Electrophysiologist on board, scheduled for EP study today  PRN analgesia  Activity as tolerated      DVT Prophylaxis:     Diet: Diet NPO Exceptions are: Sips of Water with Meds, Ice Chips  Code Status: Full Code    PT/OT Eval Status: N/A    Dispo - Home once stable    [facility] Dana Knox MD 12/15/2022 10:38 AM

## 2022-12-15 NOTE — ANESTHESIA PRE PROCEDURE
Department of Anesthesiology  Preprocedure Note       Name:  Hans Renae   Age:  23 y.o.  :  2000                                          MRN:  [id number]         Date:  12/15/2022      Surgeon: Danny Gilliland    Procedure: EPS    Medications prior to admission:   Prior to Admission medications    Medication Sig Start Date End Date Taking? Authorizing Provider   ibuprofen (ADVIL;MOTRIN) 800 MG tablet Take 1 tablet by mouth every 8 hours as needed for Pain 11/5/22 11/15/22  Florencio Gilliland PA-C       Current medications:    Current Facility-Administered Medications   Medication Dose Route Frequency Provider Last Rate Last Admin    sodium chloride flush 0.9 % injection 10 mL  10 mL IntraVENous 2 times per day Aden Pickard MD   10 mL at 22    sodium chloride flush 0.9 % injection 10 mL  10 mL IntraVENous PRN Carmellar Leticia Luna MD        0.9 % sodium chloride infusion   IntraVENous PRN James Luna MD        ondansetron (ZOFRAN-ODT) disintegrating tablet 4 mg  4 mg Oral Q8H PRN James Luna MD        Or    ondansetron [facility]) injection 4 mg  4 mg IntraVENous Q6H PRN Carmellar Leticia Luna MD        magnesium hydroxide (MILK OF MAGNESIA) 400 MG/5ML suspension 30 mL  30 mL Oral Daily PRN James Luna MD        acetaminophen (TYLENOL) tablet 650 mg  650 mg Oral Q6H PRN James Luna MD        Or   Jw Crumbly acetaminophen (TYLENOL) suppository 650 mg  650 mg Rectal Q6H PRN James Luna MD           Allergies:  No Known Allergies    Problem List:    Patient Active Problem List   Diagnosis Code    Ventricular tachyarrhythmia I47.20    Palpitation R00.2    Wide-complex tachycardia R00.0    Elevated troponin R77.8       Past Medical History:  No past medical history on file. Past Surgical History:  No past surgical history on file.     Social History:    Social History     Tobacco Use    Smoking status: Never    Smokeless tobacco: Never   Substance Use Topics    Alcohol use: Not Currently Counseling given: Not Answered      Vital Signs (Current):   Vitals:    12/14/22 1621 12/14/22 1946 12/15/22 0542 12/15/22 0810   BP: 120/74 109/67  96/72   Pulse: 98 91  80   Resp: 16 16  16   Temp: 36.7 °C (98.1 °F) 36 °C (96.8 °F)  36.5 °C (97.7 °F)   TempSrc: Temporal Temporal  Temporal   SpO2: 98% 98%  100%   Weight: 150 lb (68 kg)  156 lb 4.8 oz (70.9 kg) 160 lb (72.6 kg)   Height: 6' (1.829 m)   6' (1.829 m)                                              BP Readings from Last 3 Encounters:   12/15/22 96/72   11/05/22 123/71       NPO Status:  >8 hrs                                                                               BMI:   Wt Readings from Last 3 Encounters:   12/15/22 160 lb (72.6 kg)   11/05/22 160 lb (72.6 kg)     Body mass index is 21.7 kg/m². CBC:   Lab Results   Component Value Date/Time    WBC 4.2 12/13/2022 08:24 AM    RBC 5.22 12/13/2022 08:24 AM    HGB 14.7 12/13/2022 08:24 AM    HCT 44.7 12/13/2022 08:24 AM    MCV 85.6 12/13/2022 08:24 AM    RDW 12.0 12/13/2022 08:24 AM     12/13/2022 08:24 AM       CMP:   Lab Results   Component Value Date/Time     12/13/2022 08:24 AM    K 4.0 12/13/2022 08:24 AM     12/13/2022 08:24 AM    CO2 21 12/13/2022 08:24 AM    BUN 9 12/13/2022 08:24 AM    CREATININE 0.9 12/13/2022 08:24 AM    LABGLOM >60 12/13/2022 08:24 AM    GLUCOSE 114 12/13/2022 08:24 AM    PROT 6.7 12/13/2022 08:24 AM    CALCIUM 9.8 12/13/2022 08:24 AM    BILITOT 0.5 12/13/2022 08:24 AM    ALKPHOS 82 12/13/2022 08:24 AM    AST 20 12/13/2022 08:24 AM    ALT 16 12/13/2022 08:24 AM       POC Tests: No results for input(s): POCGLU, POCNA, POCK, POCCL, POCBUN, POCHEMO, POCHCT in the last 72 hours.     Coags: No results found for: PROTIME, INR, APTT    HCG (If Applicable): No results found for: PREGTESTUR, PREGSERUM, HCG, HCGQUANT     ABGs: No results found for: PHART, PO2ART, XTS7VNX, MJG6XXX, BEART, K0MRRNBO     Type & Screen (If Applicable):  No results found for: LABABO, LABRH    Drug/Infectious Status (If Applicable):  No results found for: HIV, HEPCAB    COVID-19 Screening (If Applicable): No results found for: COVID19        Anesthesia Evaluation  Patient summary reviewed and Nursing notes reviewed no history of anesthetic complications:   Airway: Mallampati: II  TM distance: >3 FB   Neck ROM: full  Mouth opening: > = 3 FB   Dental: normal exam         Pulmonary:normal exam  breath sounds clear to auscultation  (+) current smoker                          ROS comment: vape use   Cardiovascular:  Exercise tolerance: good (>4 METS),   (+) dysrhythmias: ventricular tachycardia,       ECG reviewed  Rhythm: regular  Rate: normal  Echocardiogram reviewed  Stress test reviewed  Cleared by cardiology              Neuro/Psych:   Negative Neuro/Psych ROS              GI/Hepatic/Renal: Neg GI/Hepatic/Renal ROS            Endo/Other:              Pt had no PAT visit        ROS comment: ETOH occ. Energy drinks Abdominal:             Vascular: negative vascular ROS. Other Findings:         Type of Study      TTE procedure:Echo Complete W/ Dop & Color Flow. Procedure Date  Date: 12/12/2022 Start: 09:56 AM     Study Location: Portable  Technical Quality: Adequate visualization     Indications:Palpitations. Patient Status: Routine     Height: 72 inches Weight: 150 pounds BSA: 1.89 m^2 BMI: 20.34 kg/m^2     BP: 100/52 mmHg      Findings      Left Ventricle   Left ventricle size is normal.   Normal left ventricular wall thickness. Ejection fraction is visually estimated at 55%. No regional wall motion abnormalities seen. Normal left ventricular diastolic filling pattern for age. No evidence of left ventricular mass or thrombus noted. Left ventricle false cord(normal variant). Right Ventricle   Normal right ventricular size and function. Left Atrium   Left atrium is of normal size. Interatrial septum appears intact. Right Atrium   Normal right atrium size. Mitral Valve   Structurally normal mitral valve. No evidence of mitral valve stenosis. No evidence of mitral regurgitaton. Tricuspid Valve   The tricuspid valve appears structurally normal.   Physiologic and/or trace tricuspid regurgitation. RVSP is normal.      Aortic Valve   Aortic valve opens well. No evidence of aortic valve regurgitation. No   hemodynamically significant aortic stenosis is present. Pulmonic Valve   The pulmonic valve was not well visualized. No evidence of any pulmonic regurgitation. Pericardial Effusion   No evidence of pericardial effusion. Aorta   Aortic root within normal limits. Conclusions      Summary   No previous echo for comparison. Technically adequate study. Essentially   normal study. Signature      ----------------------------------------------------------------   Electronically signed by Cade Epps MD(Interpreting   physician) on 12/12/2022 04:34 PM  [address]           Narrative & Impression    Normal sinus rhythm with sinus arrhythmia  Normal ECG  When compared with ECG of 12-DEC-2022 09:36,  No significant change was found  Confirmed by Earl Musa ([id number]) on 12/13/2022 1:30:36 PM      Specimen Collected: 12/12/22 16:09 EST Last Resulted: 12/13/22 13:30 EST                 Summary:       1. Normal left ventricular size by 1D confirmed to be normal by 3D   volumetric analysis. Normal left ventricular systolic function. No evidence of left ventricular hypertrophy by a left ventricular mass   index. No regional wall motion abnormalities. 3D LVEF = 57 %. 2.  Normal right ventricular size by 1D. Normal right ventricular systolic function. 3.   No evidence of significant valvular heart disease.        4.  No evidence of congenital heart disease: no atrial septal defect,   including the absence of a sinus venous or coronary sinus defects. No evidence of a ventricular septal defect including the absence of a   supracristal, membranous, inlet or outlet defects. No evidence of a patent ductus arteriosus or coarctation of the aorta. No evidence of anomalous pulmonary venous return i.e. all four   pulmonary veins enter into the normal morphologic left atrium. 5.  Normal sized left and right atria. 6.  TIR imaging was performed and is normal ( no evidence of   intramyocardial water). 7.  First-pass resting perfusion is normal with no resting perfusion   abnormalities. 8.  Late gadolinium enhancement (LGE) imaging was performed and is   normal.   No evidence of intramyocardial fibrosis, inflammatory pattern,   infiltrative disease or prior infarct. 9.  No evidence of pericardial or pleural effusion. 10. The main pulmonary artery right and left pulmonary arteries are   of normal caliber. The ascending aorta, aortic root, sinus of   Valsalva, sino-tubular junction are of normal caliber. The aortic   arch, descending thoracic aorta and the aorta at the level of the   diaphragm are of normal caliber. Superior and inferior vena cava and   coronary sinus are normal in caliber. 11. Extracardiac findings: No mediastinal lymphadenopathy, lung fields   clear no other extracardiac findings. Impression   1. Normal LV size and function, LVEF 57%. 2.  No regional wall motion abnormalities. 3.  Normal RV size and function. 4.  No significant valvular heart disease. 5.  No evidence of intramyocardial fibrosis, inflammatory pattern,   infiltrative disease or prior infarct. 6.  No evidence of pericardial effusion. Lacie March MD, [facility], [address] cardiology            EXAMINATION:   ONE XRAY VIEW OF THE CHEST12/12/2022       TECHNIQUE:   Frontal view submitted       COMPARISON:   None.        HISTORY:   ORDERING SYSTEM PROVIDED HISTORY: chest pain   TECHNOLOGIST PROVIDED HISTORY:   Reason for exam:->chest pain   What reading provider will be dictating this exam?->CRC       FINDINGS:   The lungs are clear without focal consolidation, large pleural effusion, or   pneumothorax. The cardiomediastinal silhouette is stable. Impression   No acute cardiopulmonary process. Anesthesia Plan      MAC     ASA 2       Induction: intravenous. Anesthetic plan and risks discussed with patient. Use of blood products discussed with patient whom. Plan discussed with attending.                     JO Winston - CRNA   12/15/2022

## 2022-12-15 NOTE — PLAN OF CARE
Problem: Chronic Conditions and Co-morbidities  Goal: Patient's chronic conditions and co-morbidity symptoms are monitored and maintained or improved  Outcome: Progressing  Flowsheets (Taken 12/15/2022 1818)  Care Plan - Patient's Chronic Conditions and Co-Morbidity Symptoms are Monitored and Maintained or Improved: Monitor and assess patient's chronic conditions and comorbid symptoms for stability, deterioration, or improvement     Problem: Safety - Adult  Goal: Free from fall injury  Outcome: Progressing  Flowsheets (Taken 12/15/2022 1846)  Free From Fall Injury: Instruct family/caregiver on patient safety     Problem: Discharge Planning  Goal: Discharge to home or other facility with appropriate resources  Outcome: Progressing  Flowsheets (Taken 12/15/2022 1818)  Discharge to home or other facility with appropriate resources: Identify barriers to discharge with patient and caregiver

## 2022-12-15 NOTE — CARE COORDINATION
12/15 Care Coordination: For EP study today. Was unable to complete yesterday d/t lack  of Anesthesia. CM/SW will continue to follow for discharge planning.    Edith HOUGHN,RN--BC  [phone]

## 2022-12-16 ENCOUNTER — TELEPHONE (OUTPATIENT)
Dept: NON INVASIVE DIAGNOSTICS | Age: 22
End: 2022-12-16

## 2022-12-16 VITALS
SYSTOLIC BLOOD PRESSURE: 87 MMHG | DIASTOLIC BLOOD PRESSURE: 53 MMHG | OXYGEN SATURATION: 98 % | TEMPERATURE: 97.9 F | HEIGHT: 72 IN | BODY MASS INDEX: 21.6 KG/M2 | RESPIRATION RATE: 18 BRPM | HEART RATE: 96 BPM | WEIGHT: 159.5 LBS

## 2022-12-16 LAB
ANION GAP SERPL CALCULATED.3IONS-SCNC: 12 MMOL/L (ref 7–16)
BUN BLDV-MCNC: 11 MG/DL (ref 6–20)
CALCIUM SERPL-MCNC: 9.4 MG/DL (ref 8.6–10.2)
CHLORIDE BLD-SCNC: 105 MMOL/L (ref 98–107)
CO2: 22 MMOL/L (ref 22–29)
CREAT SERPL-MCNC: 0.8 MG/DL (ref 0.7–1.2)
EKG ATRIAL RATE: 81 BPM
EKG P AXIS: 57 DEGREES
EKG P-R INTERVAL: 130 MS
EKG Q-T INTERVAL: 346 MS
EKG QRS DURATION: 92 MS
EKG QTC CALCULATION (BAZETT): 401 MS
EKG R AXIS: 83 DEGREES
EKG T AXIS: 57 DEGREES
EKG VENTRICULAR RATE: 81 BPM
GFR SERPL CREATININE-BSD FRML MDRD: >60 ML/MIN/1.73
GLUCOSE BLD-MCNC: 96 MG/DL (ref 74–99)
HCT VFR BLD CALC: 43.5 % (ref 37–54)
HEMOGLOBIN: 14.7 G/DL (ref 12.5–16.5)
MCH RBC QN AUTO: 27.8 PG (ref 26–35)
MCHC RBC AUTO-ENTMCNC: 33.8 % (ref 32–34.5)
MCV RBC AUTO: 82.4 FL (ref 80–99.9)
PDW BLD-RTO: 12 FL (ref 11.5–15)
PLATELET # BLD: 224 E9/L (ref 130–450)
PMV BLD AUTO: 9.8 FL (ref 7–12)
POTASSIUM SERPL-SCNC: 3.9 MMOL/L (ref 3.5–5)
RBC # BLD: 5.28 E12/L (ref 3.8–5.8)
SODIUM BLD-SCNC: 139 MMOL/L (ref 132–146)
WBC # BLD: 7.4 E9/L (ref 4.5–11.5)

## 2022-12-16 PROCEDURE — 80048 BASIC METABOLIC PNL TOTAL CA: CPT

## 2022-12-16 PROCEDURE — 02583ZZ DESTRUCTION OF CONDUCTION MECHANISM, PERCUTANEOUS APPROACH: ICD-10-PCS | Performed by: INTERNAL MEDICINE

## 2022-12-16 PROCEDURE — 2580000003 HC RX 258: Performed by: INTERNAL MEDICINE

## 2022-12-16 PROCEDURE — 36415 COLL VENOUS BLD VENIPUNCTURE: CPT

## 2022-12-16 PROCEDURE — 02K83ZZ MAP CONDUCTION MECHANISM, PERCUTANEOUS APPROACH: ICD-10-PCS | Performed by: INTERNAL MEDICINE

## 2022-12-16 PROCEDURE — 93010 ELECTROCARDIOGRAM REPORT: CPT | Performed by: INTERNAL MEDICINE

## 2022-12-16 PROCEDURE — 85027 COMPLETE CBC AUTOMATED: CPT

## 2022-12-16 PROCEDURE — 99232 SBSQ HOSP IP/OBS MODERATE 35: CPT | Performed by: STUDENT IN AN ORGANIZED HEALTH CARE EDUCATION/TRAINING PROGRAM

## 2022-12-16 PROCEDURE — 93005 ELECTROCARDIOGRAM TRACING: CPT | Performed by: NURSE PRACTITIONER

## 2022-12-16 PROCEDURE — 4A023FZ MEASUREMENT OF CARDIAC RHYTHM, PERCUTANEOUS APPROACH: ICD-10-PCS | Performed by: INTERNAL MEDICINE

## 2022-12-16 PROCEDURE — 4A0234Z MEASUREMENT OF CARDIAC ELECTRICAL ACTIVITY, PERCUTANEOUS APPROACH: ICD-10-PCS | Performed by: INTERNAL MEDICINE

## 2022-12-16 RX ADMIN — Medication 10 ML: at 09:00

## 2022-12-16 ASSESSMENT — PAIN SCALES - GENERAL: PAINLEVEL_OUTOF10: 0

## 2022-12-16 NOTE — PROGRESS NOTES
[address] and Vascular [address] [facility] Electrophysiology  Inpatient Progress Report  PATIENT: Austen Mcknight  MEDICAL RECORD NUMBER: [id number]  DATE OF SERVICE:  12/16/2022  ATTENDING ELECTROPHYSIOLOGIST: Vijay Diallo DO    PRIMARY ELECTROPHYSIOLOGIST: Kendall Felix MD   REFERRING PHYSICIAN: Hemant Elam MD (Inactive)  CHIEF COMPLAINT: Palpitations     HPI: This is a 23 y.o. male with a history of appendicitis S/p appendectomy. He notes no familial history of sudden cardiac death, no syncope. He has complained of palpitations since he was 17years old typically it were associated with exertion, during basketball or work, lasting 30 to 60 minutes at 1 point he approached his pediatrician regarding this issue an EKG was performed and was described as normal and thus no additional testing was performed. This episodes are self terminating without a vagal maneuvers associated with lightheadedness, chest discomfort and feeling of rapid heartbeat. He does drink alcohol, vape. 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 Yesterday (12/11/2022) at 2200 he was at work when he began to have palpitations these palpitations lasted for approximately 3 hours as it did not stop spontaneously he presented to the emergency department [facility] where he was found in a wide-complex tachycardia with a heart rate of 220 bpm, in the emergency department he was given IV lidocaine and spontaneously converted to sinus rhythm at that time. 12/13/2022: Patient is seen in hospital follow-up, at 9 35-9 37 he had recurrent narrow complex short RP tachycardia with a max rate of 154 bpm with a significant warm-up phase. No cardiac complaints at this time, cardiac MRI has been completed but not read he is agreeable to EP study, this was discussed with both his father and mother. 12/14/22: stable on monitor today.  Was NPO and plan for EP study today but delayed due to EP lab delays/staffing. Patient agreeable to have EP study with possible ablation tomorrow. Father at bedside and verbalized understanding. 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 Patient has been sinus with some sinus tach episodes overnight. Was treated with IV fluids and is eating and drinking well today, heart rate stable 80s to 90s. Groin sites stable without any hematoma, significant pain or drainage. Patient Active Problem List   Diagnosis    Ventricular tachyarrhythmia    Palpitation    Wide-complex tachycardia    Elevated troponin    Chest pain   who presents to the hospital complaining of palpitations, heart racing. Cardiac electrophysiology service is consulted for wide-complex tachycardia, suspected SVT. Patient Active Problem List    Diagnosis Date Noted    Chest pain 12/15/2022     Priority: Medium    Elevated troponin 12/13/2022     Priority: Medium    Ventricular tachyarrhythmia 12/12/2022     Priority: Medium    Palpitation 12/12/2022     Priority: Medium    Wide-complex tachycardia 12/12/2022     Priority: Medium       No past medical history on file. No family history on file.     Social History     Tobacco Use    Smoking status: Never    Smokeless tobacco: Never   Substance Use Topics    Alcohol use: Not Currently       Current Facility-Administered Medications   Medication Dose Route Frequency Provider Last Rate Last Admin    sodium chloride flush 0.9 % injection 5-40 mL  5-40 mL IntraVENous 2 times per day Nano Chairez MD   10 mL at 12/16/22 0900    sodium chloride flush 0.9 % injection 5-40 mL  5-40 mL IntraVENous PRN Nano Chairez MD        0.9 % sodium chloride infusion   IntraVENous PRN Nano Chairez MD        sodium chloride flush 0.9 % injection 10 mL  10 mL IntraVENous 2 times per day Nano Chairez MD   10 mL at 12/16/22 0900    sodium chloride flush 0.9 % injection 10 mL  10 mL IntraVENous PRN Rexann Aase, MD        0.9 % sodium chloride infusion   IntraVENous PRN Rexann Aase, MD        magnesium hydroxide (MILK OF MAGNESIA) 400 MG/5ML suspension 30 mL  30 mL Oral Daily PRN Rexann Aase, MD        acetaminophen (TYLENOL) tablet 650 mg  650 mg Oral Q6H PRN Rexann Aase, MD        Or    acetaminophen (TYLENOL) suppository 650 mg  650 mg Rectal Q6H PRN Rexann Aase, MD            No Known Allergies    ROS:   Constitutional: Negative for fever, activity change and appetite change. HENT: Negative for epistaxis. Eyes: Negative for diploplia, blurred vision. Respiratory: Negative for cough, chest tightness, shortness of breath and wheezing. Cardiovascular: pertinent positives in HPI  Gastrointestinal: Negative for abdominal pain and blood in stool. All other review of systems are negative     PHYSICAL EXAM:   Vitals:    12/15/22 2238 12/16/22 0421 12/16/22 0701 12/16/22 1137   BP: 107/80 96/61 (!) 95/55 111/66   Pulse: 90 87 (!) 119 95   Resp: 16 17 18 18   Temp: 98.4 °F (36.9 °C) 97.5 °F (36.4 °C) 97.9 °F (36.6 °C) 98.1 °F (36.7 °C)   TempSrc: Temporal Temporal Temporal Temporal   SpO2:  97% 98% 98%   Weight:  159 lb 8 oz (72.3 kg)     Height:          Constitutional: Well-developed, no acute distress, father at bedside. Eyes: conjunctivae normal, no xanthelasma   Ears, Nose, Throat: oral mucosa moist, no cyanosis   CV: no JVD. Regular rate and rhythm. Normal S1S2 and no S3. No murmurs, rubs, or gallops.  PMI is nondisplaced  Lungs: clear to auscultation bilaterally, normal respiratory effort without used of accessory muscles  Abdomen: soft, non-tender, bowel sounds present, no masses or hepatomegaly   Musculoskeletal: no digital clubbing, no edema   Skin: warm, no rashes     I have personally reviewed the laboratory, cardiac diagnostic and radiographic testing as outlined below:    Data:    Recent Labs     12/16/22  0757   WBC 7.4   HGB 14.7   HCT 43.5        Recent Labs 12/16/22  0740      K 3.9      CO2 22   BUN 11   CREATININE 0.8   CALCIUM 9.4      Lab Results   Component Value Date/Time    MG 2.3 12/15/2022 06:33 AM     No results for input(s): TSH in the last 72 hours. No results for input(s): INR in the last 72 hours. CXR 12/12/2022: The lungs are clear without focal consolidation, large pleural effusion, or   pneumothorax. The cardiomediastinal silhouette is stable. Impression   No acute cardiopulmonary process. Telemetry: Sinus rhythm 80-130s    EKG 12/12/2022: WCT with a rate of 222 bpm, QRS 162ms, suspected SVT with aberrancy  Please see scan in Cardiology. EKG 12/12/2022  NSR no delta waves rate 92 BPM, , QTc 415ms       Echocardiogram 12/12/2022:   Left Ventricle   Left ventricle size is normal.   Normal left ventricular wall thickness. Ejection fraction is visually estimated at 55%. No regional wall motion abnormalities seen. Normal left ventricular diastolic filling pattern for age. No evidence of left ventricular mass or thrombus noted. Left ventricle false cord(normal variant). Right Ventricle   Normal right ventricular size and function. Left Atrium   Left atrium is of normal size. Interatrial septum appears intact. Right Atrium   Normal right atrium size. Mitral Valve   Structurally normal mitral valve. No evidence of mitral valve stenosis. No evidence of mitral regurgitaton. Tricuspid Valve   The tricuspid valve appears structurally normal.   Physiologic and/or trace tricuspid regurgitation. RVSP is normal.      Aortic Valve   Aortic valve opens well. No evidence of aortic valve regurgitation. No   hemodynamically significant aortic stenosis is present. Pulmonic Valve   The pulmonic valve was not well visualized. No evidence of any pulmonic regurgitation. Pericardial Effusion   No evidence of pericardial effusion.       Aorta   Aortic root within normal limits. Conclusions      Summary   No previous echo for comparison. Technically adequate study. Essentially   normal study. Signature      ----------------------------------------------------------------   Electronically signed by Paco Wheatley MD(Interpreting   physician) on 12/12/2022 04:34 PM   ----------------------------------------------------------------       Cardiac MRI 12/12/22        Impression   1. Normal LV size and function, LVEF 57%. 2.  No regional wall motion abnormalities. 3.  Normal RV size and function. 4.  No significant valvular heart disease. 5.  No evidence of intramyocardial fibrosis, inflammatory pattern,   infiltrative disease or prior infarct. 6.  No evidence of pericardial effusion. Pastor March MD, Santino Mulligan 9 cardiology         I have independently reviewed all of the ECGs and rhythm strips per above     Assessment/Plan:    1. Orthodromic AVNRT status post RF ablation  -Noted as wide-complex tachycardia  - Ongoing complaints since 17years of age, no prior syncope nor SCD in the Family. - Terminated with IV Lidocaine; Adenosine not tried (12/11/2022). - Normal echo 12/12/2022      Recommendations:  Cardiac MRI WN  Ok foir discharge today   3. EKG in 1 week   4. OV in 4-6 weeks in office     I have spent a total of 10 minutes with the patient and the family reviewing the above stated recommendations. And a total of >50% of that time involved face-to-face time providing counseling and or coordination of care with the other providers. Thank you for allowing me to participate in your patient's care. Please call me if there are any questions or concerns. Discussed with Dr. Chelsy Carroll.    Jena Quiroz, JO - CNP  Cardiac Electrophysiology  [address] [place] [address]  [facility]  12:27 PM  12/16/2022    Attending Supervising Physicians Attestation Statement  I performed at least 51% of the work. I was present with the nurse practitioner during the history and exam. I discussed the findings and plans with the nurse practitioner and agree as documented in her note     Jean Pierre Cleary is a 23 y.o. male with a history of orthodromic AVRT sp RFA mid septal AP (12/15/22-Dr Girard) and appendicitis sp appendectomy. He is managed by PCP with no cardiac medications. On 12/11/22, he presented with chief complaint palpitations and chest discomfort. He was diagnosed with WCT at 220 bpm, which terminated with IV lidocaine infusion. EP service was consulted. Patient reports similar events for ~6 years, which he describes as triggered by activity, duration of 30-60 minutes, and resolve without intervention. He denies any history of syncope. He denies any family history of SCD. TTE grossly normal. cMRI grossly normal. EP study diagnosed orthodromic AVRT and treated with RFA to mid septal AP. He denies any complaints at this time. A/P:  1. 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  - Follow-up with Dr Wilder April office in 1 month, likely with [facility]. My office will arrange. - EP service will sign off. Please contact with questions/concerns.     Electronically signed by Hilario Martinez DO on 12/16/22 at 2:42 PM EST

## 2022-12-16 NOTE — DISCHARGE INSTRUCTIONS
Ablation Patient Discharge Instructions    Medications: resume all prescribed medications as previous     Follow-Up:   ECG: you will be contacted to schedule an EKG in 1 week at Dr Yong Ceballos office. Dr Meño Parisi office appointment: you will be contacted to schedule an appointment with Dr Meño Parisi office in 4 weeks for a follow-up evaluation. If you are not contacted within 3 business days to schedule the above, please call the [facility] office to schedule this appointment; [phone]. Questions/concerns: It is not uncommon for patients to experience brief episodes of palpitations, but please call the [facility] office if you are having frequent symptoms. Incision Care: Check bilateral groins daily. No soaking in a bathtub, hot tub, or pool for one week. You may shower. If you find any redness, swelling, drainage, warmth, or have a fever greater than 100 degrees, notify the office immediately at [phone]. Activity: No lifting greater than 10 lbs for 7 days, and no strenuous exercise for 2 weeks. Driving: no driving or operating heavy machinery for 48 hours. After 48 hours, you may drive if you feel up to it. DO NOT drive until you have stopped taking prescription pain medication. [facility]:   [facility] [address] [facility], [address] [place] [address]) Electrophysiology   [phone]  FEMORAL DISCHARGE INSTRUCTIONS      Discharge Instructions:    Please follow instructions closely for prevention of complications. Special Instructions:  Splint catheterization site with activity today. Splint catheterization site with:             ~Changing Positions             ~Coughing             ~Sneezing             ~Laughing    Call your physician if you notice:  ~increased pain at the catheterization site.  ~increase swelling at the catheterization site. ~redness or drainage at the catheterization site.   ~numbness, tingling or cramping in the catheterization leg at rest or with activity. *Remove Dressing on ***   After {Time; am/pm:17243}       ~Soak dressing with water in shower and gently peel off. Clean site with soap and water, dry, and apply band aide for 24 hours. Remove band aide after another 24 hours. *Drink 3-4 extra glasses of water today. *No tub bathing or swimming for 3 days. *No stairs for 24 hours. Minimal walking today. *No driving, working, or sexual activity for 48 hours. *Do not lift anything heavier than 10 pounds for 3 days. *Continue low fat diet. **If you have any questions or problems after discharge, please notify your doctor. Call 9-1-1 if you have active bleeding from your catheterization site. ***DO  N Cody Franco. Lay down and apply pressure to site until help arrives.

## 2022-12-16 NOTE — PROGRESS NOTES
Discharged to home goals met. Discharge instructions given verbalized an understanding. Monitor removed and placed in nurses station.

## 2022-12-16 NOTE — NURSE NAVIGATOR
I met with Rodrigo Curtis today to review his ablation discharge instructions, I provided information on SVT and Ablation as well as my contact information should he have any questions. The patients mom, dad, and friend were in the room during the visit. Questions answered. Pt is anxious to be discharged home.     Time spent 20 minutes

## 2022-12-16 NOTE — DISCHARGE SUMMARY
Hospital Medicine Discharge Summary    Patient ID: Nickie Mitchell      Patient's PCP: Lenora Palencia MD (Inactive)    Admit Date: 12/12/2022     Discharge Date:   12/16/2022     Admitting Physician: Jermain Irwin MD     Discharge Physician: Harjeet Moran MD     Discharge Diagnoses: Active Hospital Problems    Diagnosis Date Noted    Chest pain [R07.9] 12/15/2022     Priority: Medium    Elevated troponin [R77.8] 12/13/2022     Priority: Medium    Ventricular tachyarrhythmia [I47.20] 12/12/2022     Priority: Medium    Palpitation [R00.2] 12/12/2022     Priority: Medium    Wide-complex tachycardia [R00.0] 12/12/2022     Priority: Medium     Ventricular tachyarrhythmia - wide complex tachycardic likely SVT with aberrancy    Chest pain - secondary to above, ACS not likely  Elevated Troponin - likely due to demand ischemia in the setting of rapid heart rate  Palpitation      The patient was seen and examined on day of discharge and this discharge summary is in conjunction with any daily progress note from day of discharge. Hospital Course: This is a 53-year-old male with no significant past medical history who  has no past medical history on file. presents with palpitation started 4 hr PTA, was at work, had intermittent similar episodes since age 17 but this was longest he experienced for approximately 30min. Denies drug use, no fever or chills, felt some chest discomfort, no nausea or vomiting no SOB no leg swelling. EKG was done in ER showed wide complex tachycardia VT, given lidocaine and converted to normal sinus rhythm. Exam:     /66   Pulse 95   Temp 98.1 °F (36.7 °C) (Temporal)   Resp 18   Ht 6' (1.829 m)   Wt 159 lb 8 oz (72.3 kg)   SpO2 98%   BMI 21.63 kg/m²     General appearance: Sitting comfortably in bed. No apparent distress. Respiratory: Clear to auscultation bilaterally. Cardiovascular: Normal S1/S2. Regular rhythm and rate.   Abdomen: Soft, non-tender, non-distended with normal bowel sounds. Musculoskeletal: No clubbing, cyanosis or edema bilaterally. Skin: Skin color, texture, turgor normal.  No rashes or lesions. Neurologic:  No focal deficit. Psychiatric: Alert and oriented, thought content appropriate, normal insight  Peripheral Pulses: +2 palpable, equal bilaterally       Consults:     IP CONSULT TO INTERNAL MEDICINE  IP CONSULT TO CARDIOLOGY  IP CONSULT TO ELECTROPHYSIOLOGY    Significant Diagnostic Studies:   ***    Disposition:  ***     Condition at Discharge: Leatha Campuzano Patient Condition:[id number]}    Discharge Instructions/Follow-up:  ***    Code Status:  Full Code ***    Activity: activity as tolerated    Diet: {diet:72761}    Labs: For convenience and continuity at follow-up the following most recent labs are provided:      CBC:    Lab Results   Component Value Date/Time    WBC 7.4 12/16/2022 07:57 AM    HGB 14.7 12/16/2022 07:57 AM    HCT 43.5 12/16/2022 07:57 AM     12/16/2022 07:57 AM       Renal:    Lab Results   Component Value Date/Time     12/16/2022 07:40 AM    K 3.9 12/16/2022 07:40 AM    K 4.0 12/13/2022 08:24 AM     12/16/2022 07:40 AM    CO2 22 12/16/2022 07:40 AM    BUN 11 12/16/2022 07:40 AM    CREATININE 0.8 12/16/2022 07:40 AM    CALCIUM 9.4 12/16/2022 07:40 AM    PHOS 5.3 12/15/2022 06:33 AM       Discharge Medications:     Current Discharge Medication List        CONTINUE these medications which have NOT CHANGED    Details   ibuprofen (ADVIL;MOTRIN) 800 MG tablet Take 1 tablet by mouth every 8 hours as needed for Pain  Qty: 30 tablet, Refills: 0             Time Spent on discharge is more than {Time; 15 min - 8 hours:48398} in the examination, evaluation, counseling and review of medications and discharge plan. Signed:    Jamal Lozoya MD   12/16/2022      Thank you Marisol Cooley MD (Inactive) for the opportunity to be involved in this patient's care.  If you have any questions or concerns please feel free to contact me at [phone].

## 2022-12-16 NOTE — ANESTHESIA POSTPROCEDURE EVALUATION
Department of Anesthesiology  Postprocedure Note    Patient: Brit Philip  MRN: [id number]  YOB: 2000  Date of evaluation: 12/15/2022      Procedure Summary     Date: 12/15/22 Room / Location: [facility] CATH LAB    Anesthesia Start: 6382 Anesthesia Stop: 8527    Procedure: ELECTROPHYSIOLOGY STUDY W ANESTHESIA Diagnosis:     Scheduled Providers: Deven Belcher MD; JO Martin - CRNA Responsible Provider: Lillian Spence MD    Anesthesia Type: MAC ASA Status: 2          Anesthesia Type: MAC    Tete Phase I:      Tete Phase II:        Anesthesia Post Evaluation    Patient location during evaluation: PACU  Patient participation: complete - patient participated  Level of consciousness: awake  Airway patency: patent  Nausea & Vomiting: no nausea and no vomiting  Complications: no  Cardiovascular status: hemodynamically stable  Respiratory status: acceptable  Hydration status: stable

## 2022-12-16 NOTE — PROCEDURES
[address]                  [address] [place],  [facility]                                 PROCEDURE NOTE    PATIENT NAME: Salomon Campos                       :        2000  MED REC NO:   [id number]                            ROOM:       [id number]  ACCOUNT NO:   [de-identified]                           ADMIT DATE: 2022  PROVIDER:     Micah Cornell MD    DATE OF PROCEDURE:  12/15/2022    NAME OF THE PROCEDURE:  Electrophysiology study and RF ablation for SVT. PREPROCEDURE DIAGNOSIS:  Wide QRS tachycardia. 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 :  Micah Cornell MD and Dr. Katerina Hutchinson. COMPLICATIONS:  None. INDICATIONS:  This is 23year-old patient who presented to the hospital  with symptoms of palpitations that he has had for several years. Usually, the episodes subside spontaneously with rest, but this episode  lasted for several hours, and therefore the patient presented to the  emergency room with a wide QRS tachycardia. On presentation, his EKG  revealed tachycardia with a cycle length of 280 milliseconds and a left  bundle-branch block morphology in the chest leads. In the emergency  room, the patient received IV lidocaine. This converted the patient to  sinus rhythm. Electrophysiology testing and EP guided management of  possible ablation was discussed with the patient, and the patient was  brought in for the procedure today. Risks and benefits associated with  the procedure including possible pericardial tamponade, deep venous  thrombosis, pneumothorax, but not limited to the above were all  explained. The patient understood and was agreeable to proceed with the  same. Since the patient is 23years old, the medical director was  contacted to determine that the adult service best seves the need of this patient.   It was agreed after discussion that the patient can safely and most effectively be cared for  utilizing the adult service  available at [facility] for the invasive procedure. The patient was therefore brought in for electrophysiology testing  today. DESCRIPTION OF THE PROCEDURE:  The patient was brought to  electrophysiology area in the postabsorptive, nonsedated state. After  the usual noninvasive blood pressure and heart rate monitoring were  instituted, the patient was sedated with IV medications by Anesthesia. Two 6-Algerian and a 7-Algerian introducers were placed in the left femoral  vein. A 6 and 8-Algerian introducers were placed initially in the right  femoral vein. The 7-Algerian introducer on the left side was utilized to  place a decapolar catheter into the coronary sinus. The 6-Algerian  introducers were utilized for high right atrial catheter along with the  RV catheter and the His bundle catheter. These catheters were  positioned appropriately as mentioned, and baseline testing was  completed. At baseline, the patient's sinus cycle length was 780 milliseconds, IL  109, QRS 95, QT was 303, AH 72 and HV was 35 milliseconds. On incremental atrial pacing,AV block occurred at less than 280 milliseconds and VA block at 310 milliseconds. AV and VA conduction appeared to proceed the normal pathway. Programmed stimulation was now performed using a drive cycle of 300 from  the high right atrium. At a basic drive cycle of 994, AV ramos ERP was  less than 200 milliseconds and with two extra stimuli using an S2 at  220, AV block occurred at 230 milliseconds. Frequent AV reentrant  echoes were noted. However, sustained tachycardia was not induced. Nonsustained tachycardia with up to 7 to 10 beats was noted frequently  with two extra stimuli testing. Programmed stimulation was then  completed with this drive cycle of 129 and again nonsustained AV  reentrant tachycardia utilizing what was felt to be initially a  posteroseptal accessory pathway.     On programmed stimulation from the right ventricle, however with single  extra stimulus, the accessory pathway conduction was not evident. Nevertheless, we then proceeded with infusion of IV Isuprel. With single and double extra stimulus testing and drive cycles of 906 and 400 on Isuprel only up to 15-20 beats of AV reentrant tachycardia was induced. The cycle length of this tachycardia varied  between 260-320 milliseconds initially with a narrow QRS and thereafter  with a right bundle morphology with no cycle length changes with a right  bundle morphology. I then proceeded with mapping of this pathway from  the right side. We then proceeded with RF ablation and mapping of this posteroseptal  accessory pathway from the right side. The catheter was positioned in  the posteroseptal region, and RF lesions were initially delivered in the  posteroseptal and midseptal regions. Junctional rhythm such as that  obtained with slow pathway ablation was noted during the first two or  three lesions. However, the tachycardia remained inducible. I then  mapped the pathway into the mid septal region and since this mapping was  unsatisfactory, we continued testing. The patient's clinical  tachycardia remained inducible now with a left bundle-branch morphology. Cycle length of the tachycardia with a left bundle morphology increased  by 20-30 milliseconds, and therefore I considered proceeding this  mapping of the pathway from the left side. Dr. Stephany Rodriguez was contacted for a transseptal puncture to place and map RF  catheter from the left side of the septum. After placement of a  deflectable transseptal sheath in the left atrium, the RF catheter was  positioned in the mitral annulus. The catheter was utilized to map during  tachycardia on the left side of the septum. Retrograde atrial activation   was not early on the left side of the septum.   The RF catheter was pulled out of the left atrium and then placed back  again into the tricuspid annular region and the pathway was mapped in  the mid septal region. After the third lesion in this mid septal  region, there was complete elimination of conduction using the AP. The patient was retested  using single and double extra stimuli from the high right atrium at baseline and after Isuprel. AV conduction remained unchanged with AV block  occurring at less than 300 milliseconds and VA block now occurred at 410  milliseconds. The patient's clinical supraventricular tachycardia or  AVRT was not inducible at all at baseline and after Isuprel as noted. At this point, therefore the procedure was completed. Catheters and  introducers were removed, and hemostasis achieved in both joints. The  patient was returned to his room for recovery. CONCLUSION:  1. Normal intracardiac intervals at baseline. 2.  Easily inducible orthodromic AVRT at baseline testing and after  Isuprel although sustained tachycardia was not induced even on Isuprel  at baseline. 3.  Successful RF ablation of this mid septal accessory pathway from the  tricuspid annular region, although we did map the pathway from the  tricuspid and mitral annular regions. PLAN:  Recovery in the hospital overnight with discharge home tomorrow  to be followed up as outpatient.         Amber Cooper MD    D: 12/15/2022 22:45:09       T: 12/15/2022 22:52:15     [email or web address]  Job#: [id number]     Doc#: [id number]    CC:

## 2022-12-16 NOTE — CARE COORDINATION
Care coordination:  Met with patient and mom and step dad to update discharge planning. 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 And also works part time. His plan is return there. Girlfriend will monitor and mom states she lives about a mile away . He has a new PCP, Dr. Maegan Estrella in [facility]. And will use [facility] on [address] for medications. Plan is to return to his home. Family to  transport. Hopeful for discharge today,  No further needs at this time.

## 2022-12-16 NOTE — TELEPHONE ENCOUNTER
----- Message from Ave Pedroza DO sent at 12/16/2022  2:49 PM EST -----  Regarding: appt  [facility] or [facility] in ~4 weeks.   Ave Pedroza DO

## 2022-12-22 PROBLEM — I47.1 AV REENTRANT TACHYCARDIA (HCC): Status: ACTIVE | Noted: 2022-12-22

## 2022-12-22 PROBLEM — I47.19 AV REENTRANT TACHYCARDIA: Status: ACTIVE | Noted: 2022-12-22

## 2022-12-22 NOTE — OP NOTE
Operative Note      Patient: David Green  YOB: 2000  MRN: [id number]    Date of Procedure: 12/15/2022    Pre-Op Diagnosis: wide complex tachycardia    Post-Op Diagnosis: orthodromic AVRT       Procedure: transseptal access (CPT code: 86831)    Surgeon: Blanquita Goins DO     Assistant: Dr Danna Shields    Anesthesia: monitored Anesthesia care, see separate Anesthesia note    Estimated Blood Loss (mL): less than 50     Complications: None    Detailed Description of Procedure:   Verbal and written informed consent obtained from the patient and placed in the chart prior to the start of the procedure. I was asked to assist Dr Danna Shields with her EP study/ablation, specifically perform transspetal access due to suspected left atrial accessory pathway for orthodromic AVRT. The patient was sedated with monitored anesthesia on my arrival to the procedure. The right and left groin sites were previously prepared in a sterile fashion. I used ultrasound was used to guide central venous access in order to reduce vascular access complications and time to gain access. Using a modified Seldinger technique, a 63 cm 8.5 Fr Mass RootsCross D0 transseptal sheath with TRUform shapeable technology sheath and a 9 Fr short sheath was placed in the right femoral vein under ultrasound guidance. The 9 F sheath was used to introduce 8F 90 cm [employer] SOUNDSTAR intracardiac echocardiography (ICE) catheter, which was then advanced into the RA. Baseline images, including interatrial septum, were obtained and used to create a 3D reconstruction of the LA and esophagus via [employer]. A patent foramen ovale was not observed on ICE imaging. The ICE catheter was positioned in th RV and a pericardial effusion was not observed. A weight based heparin bolus (100 units per kilogram (kg), up to a maximum of 10,000 units) and infusion (10 units per kg) were started immediately.  The transeptal sheath and dilator were advanced over the J-wire and positioned in the left brachiocephalic vein. The wire was removed and the dilator retracted in order to position the tip of the dilator within the sheath. A 180 cm 0.035\" VersaCross RF wire was introduced to the transseptal dilator with needle tip positioned within dilator and sheath. The transseptal sheath was retracted until dilator unsheathed. The sheath, dilator, and needle were retracted with clockwise torque to position on the dilator tip on the interatrial septum under fluoroscopic and ICE guidance. The sheath, dilator, and needle were positioned on the thinnest part of the fossa ovalis in line with the left PVs based on ICE images. The needle tip was advanced beyond dilator tip and observed to tent the septum at the desired transseptal puncture location based on visualization by ICE, multi-plane fluoroscopy, and relative location of landmarks of other intra-cardiac catheters. A transseptal puncture was performed with the VersaCross needle and RF energy to create adiscrete puncture across the interatrial septum with minimal forward pressure. Left atrial access was confirmed with visualization of the VersaCross wire in the LA under ICE visualization. Immediately after transseptal access, ICE images and vital signs were assessed and revealed no new or enlarged pericardial effusion compared to baseline images and stable vital signs, respectively. The sheath and dilator were advanced over the wire in order to dilate the transseptal access site. The sheath and dilator were then exchanged for 71 cm 8.5 Fr [employer] CARTO VIZIGO small curve bidirectional deflectable sheath over the wire. Heparinized saline was infused through the [employer] sheath for the duration of the procedure. The wire and dilator were removed. The ablation catheter was introduced to the [address] [place] [address] via [address] [facility] sheath and positioned in the mid LA.  At this point, I turned the procedure back over to Dr Ame Cordero, including ACT monitoring and titration of heparin infusion to achieve goal ACT level throughout procedure. SUMMARY: Successful transseptal puncture in setting of orthodromic AVRT with suspected left atrial accessory pathway. Remainder of procedure performed by Dr Crystal Worrell.     Electronically signed by Sal Cabello DO on 12/22/2022 at 9:44 AM

## 2023-01-12 PROBLEM — R79.89 ELEVATED TROPONIN: Status: RESOLVED | Noted: 2022-12-13 | Resolved: 2023-01-12

## 2023-01-12 PROBLEM — R77.8 ELEVATED TROPONIN: Status: RESOLVED | Noted: 2022-12-13 | Resolved: 2023-01-12

## 2023-03-01 ENCOUNTER — OFFICE VISIT (OUTPATIENT)
Dept: PRIMARY CARE CLINIC | Age: 23
End: 2023-03-01

## 2023-03-01 VITALS
BODY MASS INDEX: 21.18 KG/M2 | OXYGEN SATURATION: 97 % | RESPIRATION RATE: 20 BRPM | HEIGHT: 72 IN | TEMPERATURE: 98.8 F | WEIGHT: 156.4 LBS | HEART RATE: 88 BPM | SYSTOLIC BLOOD PRESSURE: 96 MMHG | DIASTOLIC BLOOD PRESSURE: 76 MMHG

## 2023-03-01 DIAGNOSIS — I47.20 VENTRICULAR TACHYARRHYTHMIA (HCC): ICD-10-CM

## 2023-03-01 DIAGNOSIS — I47.1 AV REENTRANT TACHYCARDIA (HCC): ICD-10-CM

## 2023-03-01 DIAGNOSIS — R00.2 PALPITATIONS: Primary | ICD-10-CM

## 2023-03-01 NOTE — PROGRESS NOTES
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 Has had potentially some intermittent nerve pain and MSK pain in the last week or 2. Has seen [facility] already for event monitor which has shown symptomatic tachycardia. No issues already on echo, event, in testing. Past medical, surgical, family and social histories reviewed and updated today as appropriate    Health Maintenance Due   Topic Date Due    COVID-19 Vaccine (1) Never done    Varicella vaccine (2 of 2 - 2-dose childhood series) 2006    Depression Screen  Never done    HIV screen  Never done    Hepatitis C screen  Never done       Current Outpatient Medications   Medication Sig Dispense Refill    ibuprofen (ADVIL;MOTRIN) 800 MG tablet Take 1 tablet by mouth every 8 hours as needed for Pain (Patient not taking: Reported on 3/1/2023) 30 tablet 0     No current facility-administered medications for this visit. No Known Allergies      REVIEW OF SYSTEMS  Review of Systems   Constitutional:  Negative for chills, diaphoresis and fever. Eyes:  Negative for redness and visual disturbance. Respiratory:  Negative for cough, chest tightness, shortness of breath and wheezing. Cardiovascular:  Positive for palpitations. Negative for chest pain and leg swelling. Gastrointestinal:  Negative for abdominal pain, blood in stool, constipation, diarrhea and vomiting. Endocrine: Negative for polydipsia and polyuria. Skin:  Negative for color change, pallor and rash. Neurological:  Negative for dizziness, syncope, weakness, numbness and headaches. Psychiatric/Behavioral:  Negative for confusion and dysphoric mood. The patient is nervous/anxious. All other systems reviewed and are negative.     PHYSICAL EXAM  BP 96/76   Pulse 88   Temp 98.8

## 2023-03-01 NOTE — LETTER
[place] [address]  [place], [address]  Phone: [phone]  Fax: [phone]    Bailey Walters MD      3/1/2023     Patient: Shivani Sharp   YOB: 2000       To Whom It May Concern:    Please excuse Shivani Sharp from class 3/1/23 for an appointment. If you have any questions or concerns, please don't hesitate to call.     Sincerely,        Bailey Walters MD, [place] [phone]

## 2023-04-09 ASSESSMENT — ENCOUNTER SYMPTOMS
COLOR CHANGE: 0
WHEEZING: 0
CONSTIPATION: 0
DIARRHEA: 0
BLOOD IN STOOL: 0
EYE REDNESS: 0
COUGH: 0
ABDOMINAL PAIN: 0
VOMITING: 0
SHORTNESS OF BREATH: 0
CHEST TIGHTNESS: 0

## 2023-11-06 ENCOUNTER — OFFICE VISIT (OUTPATIENT)
Dept: PRIMARY CARE CLINIC | Age: 23
End: 2023-11-06

## 2023-11-06 VITALS
SYSTOLIC BLOOD PRESSURE: 114 MMHG | HEIGHT: 72 IN | OXYGEN SATURATION: 98 % | HEART RATE: 81 BPM | WEIGHT: 160 LBS | TEMPERATURE: 98 F | BODY MASS INDEX: 21.67 KG/M2 | DIASTOLIC BLOOD PRESSURE: 72 MMHG | RESPIRATION RATE: 18 BRPM

## 2023-11-06 DIAGNOSIS — J02.9 SORE THROAT: ICD-10-CM

## 2023-11-06 DIAGNOSIS — J01.10 ACUTE NON-RECURRENT FRONTAL SINUSITIS: Primary | ICD-10-CM

## 2023-11-06 LAB
Lab: NORMAL
PERFORMING INSTRUMENT: NORMAL
QC PASS/FAIL: NORMAL
S PYO AG THROAT QL: NORMAL
SARS-COV-2, POC: NORMAL

## 2023-11-06 RX ORDER — AMOXICILLIN 500 MG/1
500 CAPSULE ORAL 3 TIMES DAILY
Qty: 21 CAPSULE | Refills: 0 | Status: SHIPPED | OUTPATIENT
Start: 2023-11-06 | End: 2023-11-13

## 2024-07-25 ENCOUNTER — OFFICE VISIT (OUTPATIENT)
Dept: NON INVASIVE DIAGNOSTICS | Age: 24
End: 2024-07-25
Payer: COMMERCIAL

## 2024-07-25 VITALS
HEIGHT: 72 IN | RESPIRATION RATE: 18 BRPM | DIASTOLIC BLOOD PRESSURE: 68 MMHG | WEIGHT: 167 LBS | HEART RATE: 81 BPM | SYSTOLIC BLOOD PRESSURE: 116 MMHG | BODY MASS INDEX: 22.62 KG/M2

## 2024-07-25 DIAGNOSIS — I47.20 VENTRICULAR TACHYARRHYTHMIA (HCC): Primary | ICD-10-CM

## 2024-07-25 PROCEDURE — 93000 ELECTROCARDIOGRAM COMPLETE: CPT | Performed by: INTERNAL MEDICINE

## 2024-07-25 PROCEDURE — 99214 OFFICE O/P EST MOD 30 MIN: CPT | Performed by: INTERNAL MEDICINE

## 2024-07-25 NOTE — PROGRESS NOTES
heart rates up to 1 30-1 50 bpm with activity.  He does admit that his symptoms are not similar to those prior to his ablation      Patient Active Problem List    Diagnosis Date Noted    AV reentrant tachycardia (HCC) 12/22/2022     Priority: Medium    Chest pain 12/15/2022     Priority: Medium    Ventricular tachyarrhythmia (HCC) 12/12/2022     Priority: Medium    Palpitation 12/12/2022     Priority: Medium    Wide-complex tachycardia 12/12/2022     Priority: Medium       History reviewed. No pertinent past medical history.    History reviewed. No pertinent family history.    Social History     Tobacco Use    Smoking status: Never    Smokeless tobacco: Never   Substance Use Topics    Alcohol use: Yes       No current outpatient medications on file.     No current facility-administered medications for this visit.        No Known Allergies    ROS:   Constitutional: Negative for fever, activity change and appetite change.   HENT: Negative for epistaxis.   Eyes: Negative for diploplia, blurred vision.   Respiratory: Negative for cough, chest tightness, shortness of breath and wheezing.   Cardiovascular: pertinent positives in HPI  Gastrointestinal: Negative for abdominal pain and blood in stool.   All other review of systems are negative     PHYSICAL EXAM:   Vitals:    07/25/24 1325   BP: 116/68   Pulse: 81   Resp: 18   Weight: 75.8 kg (167 lb)   Height: 1.829 m (6')        Constitutional: Well-developed, no acute distress  Eyes: conjunctivae normal, no xanthelasma   Ears, Nose, Throat: oral mucosa moist, no cyanosis   CV: no JVD. Regular rate and rhythm. Normal S1S2 and no S3. No murmurs, rubs, or gallops. PMI is nondisplaced  Lungs: clear to auscultation bilaterally, normal respiratory effort without used of accessory muscles  Abdomen: soft, non-tender, nondistended  Musculoskeletal: no digital clubbing, no edema   Skin: warm, no rashes     I have personally reviewed the laboratory, cardiac diagnostic and

## 2024-07-26 ENCOUNTER — TELEPHONE (OUTPATIENT)
Dept: NON INVASIVE DIAGNOSTICS | Age: 24
End: 2024-07-26

## 2024-07-26 ENCOUNTER — PREP FOR PROCEDURE (OUTPATIENT)
Dept: NON INVASIVE DIAGNOSTICS | Age: 24
End: 2024-07-26

## 2024-07-26 RX ORDER — SODIUM CHLORIDE 9 MG/ML
INJECTION, SOLUTION INTRAVENOUS PRN
Status: CANCELLED | OUTPATIENT
Start: 2024-08-08

## 2024-07-26 RX ORDER — SODIUM CHLORIDE 0.9 % (FLUSH) 0.9 %
5-40 SYRINGE (ML) INJECTION EVERY 12 HOURS SCHEDULED
Status: CANCELLED | OUTPATIENT
Start: 2024-08-08

## 2024-07-26 RX ORDER — SODIUM CHLORIDE 0.9 % (FLUSH) 0.9 %
5-40 SYRINGE (ML) INJECTION PRN
Status: CANCELLED | OUTPATIENT
Start: 2024-08-08

## 2024-07-26 NOTE — TELEPHONE ENCOUNTER
Patient is scheduled for Loop Implant on 8/8/2024 with Dr. Justice.    Pre-op instructions given to patient a the time of office visit. Patient doesn't have to hold any medications. Patient voiced understanding.    2 week wound check will be schedule as soon as company is decided.

## 2024-07-26 NOTE — TELEPHONE ENCOUNTER
Patient is scheduled for a Loop Implant on 8/8/2024 with Dr. Justice.    CPT: 97472  ICD: I47.20 Ventricular Tachyarrhythmia    INS: primary- [employer] id: [id number]          Secondary- [facility] id: [id number]

## 2024-08-08 ENCOUNTER — HOSPITAL ENCOUNTER (OUTPATIENT)
Age: 24
Setting detail: OUTPATIENT SURGERY
Discharge: HOME | End: 2024-08-08
Attending: INTERNAL MEDICINE | Admitting: INTERNAL MEDICINE
Payer: COMMERCIAL

## 2024-08-08 VITALS
SYSTOLIC BLOOD PRESSURE: 116 MMHG | HEART RATE: 74 BPM | DIASTOLIC BLOOD PRESSURE: 65 MMHG | BODY MASS INDEX: 21.67 KG/M2 | WEIGHT: 160 LBS | HEIGHT: 72 IN | TEMPERATURE: 98.4 F | OXYGEN SATURATION: 99 % | RESPIRATION RATE: 20 BRPM

## 2024-08-08 DIAGNOSIS — I47.20 VENTRICULAR TACHYARRHYTHMIA (HCC): ICD-10-CM

## 2024-08-08 LAB
ALBUMIN SERPL-MCNC: 4.7 G/DL (ref 3.5–5.2)
ALP SERPL-CCNC: 69 U/L (ref 40–129)
ALT SERPL-CCNC: 11 U/L (ref 0–40)
ANION GAP SERPL CALCULATED.3IONS-SCNC: 10 MMOL/L (ref 7–16)
AST SERPL-CCNC: 16 U/L (ref 0–39)
BASOPHILS # BLD: 0.02 K/UL (ref 0–0.2)
BASOPHILS NFR BLD: 1 % (ref 0–2)
BILIRUB SERPL-MCNC: 0.4 MG/DL (ref 0–1.2)
BUN SERPL-MCNC: 9 MG/DL (ref 6–20)
CALCIUM SERPL-MCNC: 9.5 MG/DL (ref 8.6–10.2)
CHLORIDE SERPL-SCNC: 105 MMOL/L (ref 98–107)
CO2 SERPL-SCNC: 26 MMOL/L (ref 22–29)
CREAT SERPL-MCNC: 0.9 MG/DL (ref 0.7–1.2)
ECHO BSA: 1.92 M2
EOSINOPHIL # BLD: 0.05 K/UL (ref 0.05–0.5)
EOSINOPHILS RELATIVE PERCENT: 1 % (ref 0–6)
ERYTHROCYTE [DISTWIDTH] IN BLOOD BY AUTOMATED COUNT: 11.8 % (ref 11.5–15)
GFR, ESTIMATED: >90 ML/MIN/1.73M2
GLUCOSE SERPL-MCNC: 79 MG/DL (ref 74–99)
HCT VFR BLD AUTO: 45 % (ref 37–54)
HGB BLD-MCNC: 15 G/DL (ref 12.5–16.5)
IMM GRANULOCYTES # BLD AUTO: <0.03 K/UL (ref 0–0.58)
IMM GRANULOCYTES NFR BLD: 0 % (ref 0–5)
LYMPHOCYTES NFR BLD: 1.63 K/UL (ref 1.5–4)
LYMPHOCYTES RELATIVE PERCENT: 37 % (ref 20–42)
MAGNESIUM SERPL-MCNC: 2.1 MG/DL (ref 1.6–2.6)
MCH RBC QN AUTO: 29.2 PG (ref 26–35)
MCHC RBC AUTO-ENTMCNC: 33.3 G/DL (ref 32–34.5)
MCV RBC AUTO: 87.7 FL (ref 80–99.9)
MONOCYTES NFR BLD: 0.33 K/UL (ref 0.1–0.95)
MONOCYTES NFR BLD: 8 % (ref 2–12)
NEUTROPHILS NFR BLD: 54 % (ref 43–80)
NEUTS SEG NFR BLD: 2.34 K/UL (ref 1.8–7.3)
PLATELET # BLD AUTO: 239 K/UL (ref 130–450)
PMV BLD AUTO: 9.6 FL (ref 7–12)
POTASSIUM SERPL-SCNC: 4.1 MMOL/L (ref 3.5–5)
PROT SERPL-MCNC: 7.3 G/DL (ref 6.4–8.3)
RBC # BLD AUTO: 5.13 M/UL (ref 3.8–5.8)
SODIUM SERPL-SCNC: 141 MMOL/L (ref 132–146)
TSH SERPL DL<=0.05 MIU/L-ACNC: 0.51 UIU/ML (ref 0.27–4.2)
WBC OTHER # BLD: 4.4 K/UL (ref 4.5–11.5)

## 2024-08-08 PROCEDURE — 6360000002 HC RX W HCPCS: Performed by: INTERNAL MEDICINE

## 2024-08-08 PROCEDURE — 84443 ASSAY THYROID STIM HORMONE: CPT

## 2024-08-08 PROCEDURE — 33285 INSJ SUBQ CAR RHYTHM MNTR: CPT | Performed by: INTERNAL MEDICINE

## 2024-08-08 PROCEDURE — 85025 COMPLETE CBC W/AUTO DIFF WBC: CPT

## 2024-08-08 PROCEDURE — 2709999900 HC NON-CHARGEABLE SUPPLY: Performed by: INTERNAL MEDICINE

## 2024-08-08 PROCEDURE — 83735 ASSAY OF MAGNESIUM: CPT

## 2024-08-08 PROCEDURE — C1764 EVENT RECORDER, CARDIAC: HCPCS | Performed by: INTERNAL MEDICINE

## 2024-08-08 PROCEDURE — 2500000003 HC RX 250 WO HCPCS: Performed by: INTERNAL MEDICINE

## 2024-08-08 PROCEDURE — 80053 COMPREHEN METABOLIC PANEL: CPT

## 2024-08-08 PROCEDURE — 7100000010 HC PHASE II RECOVERY - FIRST 15 MIN: Performed by: INTERNAL MEDICINE

## 2024-08-08 DEVICE — 3+ INSERTABLE CARDIAC MONITOR
Type: IMPLANTABLE DEVICE | Site: CHEST | Status: FUNCTIONAL
Brand: ASSERT-IQ™

## 2024-08-08 RX ORDER — SODIUM CHLORIDE 9 MG/ML
INJECTION, SOLUTION INTRAVENOUS PRN
Status: DISCONTINUED | OUTPATIENT
Start: 2024-08-08 | End: 2024-08-08 | Stop reason: HOSPADM

## 2024-08-08 RX ORDER — SODIUM CHLORIDE 0.9 % (FLUSH) 0.9 %
5-40 SYRINGE (ML) INJECTION EVERY 12 HOURS SCHEDULED
Status: DISCONTINUED | OUTPATIENT
Start: 2024-08-08 | End: 2024-08-08 | Stop reason: HOSPADM

## 2024-08-08 RX ORDER — SODIUM CHLORIDE 0.9 % (FLUSH) 0.9 %
5-40 SYRINGE (ML) INJECTION PRN
Status: DISCONTINUED | OUTPATIENT
Start: 2024-08-08 | End: 2024-08-08 | Stop reason: HOSPADM

## 2024-08-08 RX ORDER — CEFAZOLIN SODIUM 1 G/3ML
INJECTION, POWDER, FOR SOLUTION INTRAMUSCULAR; INTRAVENOUS PRN
Status: DISCONTINUED | OUTPATIENT
Start: 2024-08-08 | End: 2024-08-08 | Stop reason: HOSPADM

## 2024-08-08 NOTE — DISCHARGE INSTRUCTIONS
[facility]  Implantable Cardiac Monitor Discharge Instructions For Patients      Medications: Resume all prescribed medications.    Follow-Up: You will be seen on Thursday 8/22/24 at 10:15 am at the [facility] for an incision and device check.  Please call the office if you need to reschedule this appointment. The number is [phone].     Incision Care:   Leave (brown) aquacel dressing on for 7 days.  Remove aquacel dressing on Thursday 8/15/24 but do not remove the Steri-Strips from your incision. They will fall off on their own, or they will be removed at your follow-up appointment.    You may shower starting tomorrow, but do not let the water run directly on the incision  for 7 days.   Check the area daily. If you find any redness, swelling, drainage, warmth, or have a fever greater than 100 degrees, notify the office immediately at the number listed below.     Activity: You may continue regular daily activities, but try to prevent any hard blows to the incision site.    Driving: No driving until tomorrow Friday 8/9/24    Special Instructions: Let your dentist, doctor, or medical specialist know that you have an implantable cardiac monitor so precautions can be taken to protect the device. There is no need to take antibiotics prior to dental procedures. Your device is considered to be \"MRI conditional,\" meaning that under certain circumstances, MRI exams may be performed. Your device may set off a metal detector, such as those used in airports and courtrooms. Let security know you have an implantable cardiac monitor and show them your ID card.    ID Card: You will have a temporary ID card until a permanent card is sent to you by the device company. The permanent card will look like a ’s license or credit card and should arrive within 8 weeks. Carry your ID card with you at all times.     [facility]   [address]  [address]  [facility]  [id number] [phone]  If no answer at the above number please call [phone] for assistance

## 2024-08-09 NOTE — H&P
Please see office note for details  H&P is unchanged    Plan  Proceed with loop recorder insertion

## 2024-09-05 PROCEDURE — 93298 REM INTERROG DEV EVAL SCRMS: CPT | Performed by: INTERNAL MEDICINE

## 2024-09-18 ENCOUNTER — OFFICE VISIT (OUTPATIENT)
Dept: PRIMARY CARE CLINIC | Age: 24
End: 2024-09-18

## 2024-09-18 VITALS
HEART RATE: 96 BPM | OXYGEN SATURATION: 100 % | DIASTOLIC BLOOD PRESSURE: 70 MMHG | TEMPERATURE: 98.1 F | BODY MASS INDEX: 22.35 KG/M2 | SYSTOLIC BLOOD PRESSURE: 110 MMHG | WEIGHT: 165 LBS | HEIGHT: 72 IN | RESPIRATION RATE: 16 BRPM

## 2024-09-18 DIAGNOSIS — J06.9 ACUTE URI: Primary | ICD-10-CM

## 2024-09-18 PROCEDURE — 99213 OFFICE O/P EST LOW 20 MIN: CPT | Performed by: NURSE PRACTITIONER

## 2024-09-18 RX ORDER — BROMPHENIRAMINE MALEATE, PSEUDOEPHEDRINE HYDROCHLORIDE, AND DEXTROMETHORPHAN HYDROBROMIDE 2; 30; 10 MG/5ML; MG/5ML; MG/5ML
5 SYRUP ORAL 4 TIMES DAILY PRN
Qty: 120 ML | Refills: 0 | Status: SHIPPED | OUTPATIENT
Start: 2024-09-18 | End: 2024-09-23

## 2024-09-18 RX ORDER — AZITHROMYCIN 250 MG/1
TABLET, FILM COATED ORAL
Qty: 6 TABLET | Refills: 0 | Status: SHIPPED | OUTPATIENT
Start: 2024-09-18 | End: 2024-09-28

## 2024-11-11 ENCOUNTER — HOSPITAL ENCOUNTER (OUTPATIENT)
Dept: CARDIOLOGY | Age: 24
Discharge: HOME | End: 2024-11-13
Attending: INTERNAL MEDICINE
Payer: COMMERCIAL

## 2024-11-11 VITALS
DIASTOLIC BLOOD PRESSURE: 68 MMHG | HEIGHT: 72 IN | SYSTOLIC BLOOD PRESSURE: 116 MMHG | BODY MASS INDEX: 21.67 KG/M2 | WEIGHT: 160 LBS

## 2024-11-11 DIAGNOSIS — I47.20 VENTRICULAR TACHYARRHYTHMIA (HCC): ICD-10-CM

## 2024-11-11 LAB
ECHO AV CUSP MM: 1.7 CM
ECHO AV MEAN GRADIENT: 2 MMHG
ECHO AV MEAN VELOCITY: 0.7 M/S
ECHO AV PEAK GRADIENT: 4 MMHG
ECHO AV PEAK VELOCITY: 1.1 M/S
ECHO AV VTI: 16.8 CM
ECHO BSA: 1.92 M2
ECHO LA DIAMETER INDEX: 1.34 CM/M2
ECHO LA DIAMETER: 2.6 CM
ECHO LA VOL A-L A2C: 35 ML (ref 18–58)
ECHO LA VOL A-L A4C: 16 ML (ref 18–58)
ECHO LA VOL MOD A2C: 34 ML (ref 18–58)
ECHO LA VOL MOD A4C: 15 ML (ref 18–58)
ECHO LA VOLUME AREA LENGTH: 26 ML
ECHO LA VOLUME INDEX A-L A2C: 18 ML/M2 (ref 16–34)
ECHO LA VOLUME INDEX A-L A4C: 8 ML/M2 (ref 16–34)
ECHO LA VOLUME INDEX AREA LENGTH: 13 ML/M2 (ref 16–34)
ECHO LA VOLUME INDEX MOD A2C: 18 ML/M2 (ref 16–34)
ECHO LA VOLUME INDEX MOD A4C: 8 ML/M2 (ref 16–34)
ECHO LV EF PHYSICIAN: 65 %
ECHO LV FRACTIONAL SHORTENING: 33 % (ref 28–44)
ECHO LV INTERNAL DIMENSION DIASTOLE INDEX: 2.16 CM/M2
ECHO LV INTERNAL DIMENSION DIASTOLIC: 4.2 CM (ref 4.2–5.9)
ECHO LV INTERNAL DIMENSION SYSTOLIC INDEX: 1.44 CM/M2
ECHO LV INTERNAL DIMENSION SYSTOLIC: 2.8 CM
ECHO LV IVSD: 0.8 CM (ref 0.6–1)
ECHO LV MASS 2D: 118.7 G (ref 88–224)
ECHO LV MASS INDEX 2D: 61.2 G/M2 (ref 49–115)
ECHO LV POSTERIOR WALL DIASTOLIC: 1 CM (ref 0.6–1)
ECHO LV RELATIVE WALL THICKNESS RATIO: 0.48
ECHO MV A VELOCITY: 0.4 M/S
ECHO MV E DECELERATION TIME (DT): 175.2 MS
ECHO MV E VELOCITY: 0.71 M/S
ECHO MV E/A RATIO: 1.78
ECHO MV MAX VELOCITY: 1 M/S
ECHO MV MEAN GRADIENT: 1 MMHG
ECHO MV MEAN VELOCITY: 0.4 M/S
ECHO MV PEAK GRADIENT: 4 MMHG
ECHO MV VTI: 19.3 CM
ECHO RV INTERNAL DIMENSION: 1.6 CM

## 2024-11-11 PROCEDURE — 93306 TTE W/DOPPLER COMPLETE: CPT

## 2025-06-11 PROCEDURE — 93298 REM INTERROG DEV EVAL SCRMS: CPT | Performed by: INTERNAL MEDICINE

## 2025-08-17 PROCEDURE — 93298 REM INTERROG DEV EVAL SCRMS: CPT | Performed by: INTERNAL MEDICINE

## (undated) DEVICE — PAD, DEFIB, ADULT, RADIOTRAN, PHYSIO, LO: Brand: MEDLINE